# Patient Record
Sex: MALE | Race: WHITE | NOT HISPANIC OR LATINO | Employment: FULL TIME | URBAN - METROPOLITAN AREA
[De-identification: names, ages, dates, MRNs, and addresses within clinical notes are randomized per-mention and may not be internally consistent; named-entity substitution may affect disease eponyms.]

---

## 2017-03-20 PROBLEM — C62.90 MALIGNANT NEOPLASM OF TESTIS: Status: ACTIVE | Noted: 2017-03-20

## 2020-12-30 ENCOUNTER — OFFICE VISIT (OUTPATIENT)
Dept: UROLOGY | Facility: CLINIC | Age: 45
End: 2020-12-30
Payer: COMMERCIAL

## 2020-12-30 VITALS — BODY MASS INDEX: 46.65 KG/M2 | WEIGHT: 315 LBS | HEIGHT: 69 IN

## 2020-12-30 DIAGNOSIS — N50.819 PAIN IN TESTICLE, UNSPECIFIED LATERALITY: Primary | ICD-10-CM

## 2020-12-30 DIAGNOSIS — Z85.230: ICD-10-CM

## 2020-12-30 DIAGNOSIS — N50.812 PAIN IN LEFT TESTICLE: ICD-10-CM

## 2020-12-30 LAB
BILIRUB SERPL-MCNC: NORMAL MG/DL
BLOOD URINE, POC: NORMAL
CLARITY, POC UA: CLEAR
COLOR, POC UA: YELLOW
GLUCOSE UR QL STRIP: NORMAL
KETONES UR QL STRIP: NORMAL
LEUKOCYTE ESTERASE URINE, POC: NORMAL
NITRITE, POC UA: NORMAL
PH, POC UA: 7
PROTEIN, POC: NORMAL
SPECIFIC GRAVITY, POC UA: 1.02
UROBILINOGEN, POC UA: 0.2

## 2020-12-30 PROCEDURE — 99999 PR PBB SHADOW E&M-EST. PATIENT-LVL III: CPT | Mod: PBBFAC,,, | Performed by: UROLOGY

## 2020-12-30 PROCEDURE — 3008F BODY MASS INDEX DOCD: CPT | Mod: CPTII,S$GLB,, | Performed by: UROLOGY

## 2020-12-30 PROCEDURE — 99999 PR PBB SHADOW E&M-EST. PATIENT-LVL III: ICD-10-PCS | Mod: PBBFAC,,, | Performed by: UROLOGY

## 2020-12-30 PROCEDURE — 99204 OFFICE O/P NEW MOD 45 MIN: CPT | Mod: 25,S$GLB,, | Performed by: UROLOGY

## 2020-12-30 PROCEDURE — 3008F PR BODY MASS INDEX (BMI) DOCUMENTED: ICD-10-PCS | Mod: CPTII,S$GLB,, | Performed by: UROLOGY

## 2020-12-30 PROCEDURE — 99204 PR OFFICE/OUTPT VISIT, NEW, LEVL IV, 45-59 MIN: ICD-10-PCS | Mod: 25,S$GLB,, | Performed by: UROLOGY

## 2020-12-30 PROCEDURE — 1126F PR PAIN SEVERITY QUANTIFIED, NO PAIN PRESENT: ICD-10-PCS | Mod: S$GLB,,, | Performed by: UROLOGY

## 2020-12-30 PROCEDURE — 81002 URINALYSIS NONAUTO W/O SCOPE: CPT | Mod: S$GLB,,, | Performed by: UROLOGY

## 2020-12-30 PROCEDURE — 81002 POCT URINE DIPSTICK WITHOUT MICROSCOPE: ICD-10-PCS | Mod: S$GLB,,, | Performed by: UROLOGY

## 2020-12-30 PROCEDURE — 1126F AMNT PAIN NOTED NONE PRSNT: CPT | Mod: S$GLB,,, | Performed by: UROLOGY

## 2020-12-30 NOTE — PROGRESS NOTES
UROLOGY [facility]  12 30 20    Cc L testicular pain    Age 45, says has a discomfort in the L testicle from time to time. Trauma to the testicle is enough to bring up the pain. Voids well.     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 The way this was found, was that he presented a mole on his face, and he felt the mole was changing. 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 Was told it often has another cancer in the body along with it, and underwent extensive axial tomography. This showed a tennis ball size tumor in the thymus, which was excised by dr ania amaro. The tumor was reported germ cell tumor of the thymus. Pt had close follow up with imaging after that and there were no recurrences.        PMH    Surgical:  has a past surgical history that includes Skin cancer excision; Tumor removal (2009); Soft Tissue Biopsy; [place] tooth extraction; Colonoscopy (N/A, 11/24/2015); and Mole removal (02/2016).    Medical:  has a past medical history of Allergic rhinitis due to pollen, Arthritis, Cancer, History of colon polyps, and Obesity (BMI 35.0-39.9 without comorbidity).    Familial: mother had skin cancer    Social: , lives in [place]    Meds:   Current Outpatient Medications on File Prior to Visit   Medication Sig Dispense Refill    cetirizine (ZYRTEC) 10 MG tablet Take 10 mg by mouth d      fexofenadine (ALLEGRA) 180 MG tablet Take 180 mg by mouth daily      ibuprofen (ADVIL) 200 MG tablet Take 200 mg      loratadine (CLARITIN) 10 mg tablet Take 10 mg by mouth once daily.      azelastine (ASTELIN) 137 mcg (0.1 %) nasal spray 2 sprays (274 mcg total) by [place] 30 mL 1    cholecalciferol, vitamin D3, (VITAMIN D3) 5,000 unit Tab Take 5,000 Units by mouth once daily.      EPINEPHrine (EPIPEN) 0.3 mg/0.3 mL AtIn Inject 0.6 mLs (0.6 mg total) in 2 Device 0    glucosamine-chondroitin 500-400 mg tablet Take 1 tablet by mouth  3 (th.      predniSONE (DELTASONE) 20 MG tablet 2 daily x 5 da then 1 daily (Patien) 15 tablet 0    triamcinolone acetonide 0.1% (KENALOG) 0.1 % cream JASSON AA BID FOR 10 DAYS  0     REVIEW OF SYSTEMS  GENERAL:  No headaches or dizzy spells.   HEENT: vision preserved. Sinuses: No complaints.   CARDIOPULMONARY: No swelling of the legs; no chest pain. No shortness of breath.   GASTROINTESTINAL: No heartburn. Denies diarrhea; denies constipation, no blood or mucus in stools.   GENITOURINARY: Denies dysuria, bleeding or incontinence.   MUSCULOSKELETAL: No arthritic complaints such as pain or stiffness.   PSYCHIATRIC: No history of depression or anxiety.   ENDOCRINOLOGIC: markedly obese. No reports of sweating, cold or heat intolerance.   NEUROLOGICAL: No dizziness, syncope, paralysis  LYMPHATICS: No enlarged nodes. No history of splenectomy.    Pt alert, oriented, no distress  HEENT: wnl.  Neck: supple, no JVD, no lymphadenopathy  Chest: CV NSR  Lungs: normal chest expansion, no labored breathing  Abdomen markedly prominent, morbidly obese, nontender, no organomegaly, no masses.  No hernias  Penis circumcised, meatus nl  Testes bilaterally normal, no masses or indurations. r epididymis slightly indurated at the tail, L epididymis also slightly indurated at the tail, more than R. Scrotum development normal  Extremities: no edema, peripheral pulses nl  Neuro: preserved    IMP    Mild aching in L testicle.  Hx of germ cell tumor of the thymus; also sebaceous carcinoma of face skin and basal cell carcinoma of skin (three cancer primaries over 3 years)    No objective pathology found in the testicles. I am ordering scrotal ultrasound

## 2021-02-01 ENCOUNTER — HOSPITAL ENCOUNTER (OUTPATIENT)
Dept: RADIOLOGY | Facility: HOSPITAL | Age: 46
Discharge: HOME | End: 2021-02-01
Attending: UROLOGY
Payer: COMMERCIAL

## 2021-02-01 DIAGNOSIS — N50.812 PAIN IN LEFT TESTICLE: ICD-10-CM

## 2021-02-01 PROCEDURE — 76870 US SCROTUM AND TESTICLES: ICD-10-PCS | Mod: 26,,, | Performed by: RADIOLOGY

## 2021-02-01 PROCEDURE — 76870 US EXAM SCROTUM: CPT | Mod: TC,PO

## 2021-02-01 PROCEDURE — 76870 US EXAM SCROTUM: CPT | Mod: 26,,, | Performed by: RADIOLOGY

## 2021-03-24 ENCOUNTER — PATIENT MESSAGE (OUTPATIENT)
Dept: ADMINISTRATIVE | Facility: OTHER | Age: 46
End: 2021-03-24

## 2021-03-25 ENCOUNTER — IMMUNIZATION (OUTPATIENT)
Dept: FAMILY MEDICINE | Facility: CLINIC | Age: 46
End: 2021-03-25
Payer: COMMERCIAL

## 2021-03-25 DIAGNOSIS — Z23 NEED FOR VACCINATION: Primary | ICD-10-CM

## 2021-03-25 PROCEDURE — 91300 COVID-19, MRNA, LNP-S, PF, 30 MCG/0.3 ML DOSE VACCINE: ICD-10-PCS | Mod: ,,, | Performed by: INTERNAL MEDICINE

## 2021-03-25 PROCEDURE — 91300 COVID-19, MRNA, LNP-S, PF, 30 MCG/0.3 ML DOSE VACCINE: CPT | Mod: ,,, | Performed by: INTERNAL MEDICINE

## 2021-03-25 PROCEDURE — 0001A COVID-19, MRNA, LNP-S, PF, 30 MCG/0.3 ML DOSE VACCINE: ICD-10-PCS | Mod: CV19,,, | Performed by: INTERNAL MEDICINE

## 2021-03-25 PROCEDURE — 0001A COVID-19, MRNA, LNP-S, PF, 30 MCG/0.3 ML DOSE VACCINE: CPT | Mod: CV19,,, | Performed by: INTERNAL MEDICINE

## 2021-04-15 ENCOUNTER — IMMUNIZATION (OUTPATIENT)
Dept: FAMILY MEDICINE | Facility: CLINIC | Age: 46
End: 2021-04-15
Payer: COMMERCIAL

## 2021-04-15 DIAGNOSIS — Z23 NEED FOR VACCINATION: Primary | ICD-10-CM

## 2021-04-15 PROCEDURE — 91300 COVID-19, MRNA, LNP-S, PF, 30 MCG/0.3 ML DOSE VACCINE: CPT | Mod: PBBFAC | Performed by: FAMILY MEDICINE

## 2021-04-15 PROCEDURE — 0002A COVID-19, MRNA, LNP-S, PF, 30 MCG/0.3 ML DOSE VACCINE: CPT | Mod: PBBFAC | Performed by: FAMILY MEDICINE

## 2022-09-15 ENCOUNTER — TELEPHONE (OUTPATIENT)
Dept: GASTROENTEROLOGY | Facility: CLINIC | Age: 47
End: 2022-09-15
Payer: COMMERCIAL

## 2022-09-15 NOTE — TELEPHONE ENCOUNTER
Rescheduled colonoscopy with Dr. Sainz to [facility] on 1/12/23 due to BMI.  Patient agreed to new appointment date and time.

## 2022-09-15 NOTE — TELEPHONE ENCOUNTER
----- Message from Cortney Wiggins LPN sent at 9/15/2022 11:06 AM CDT -----  Contact: pt at [phone]    ----- Message -----  From: Tamiko Mcginnis  Sent: 9/15/2022  10:30 AM CDT  To: [facility] Staff    Type: Needs Medical Advice  Who Called:  pt    Best Call Back Number: [phone]    Additional Information: pt is calling the office to schedule a scope. Please call back and advise.

## 2022-09-19 ENCOUNTER — OFFICE VISIT (OUTPATIENT)
Dept: GASTROENTEROLOGY | Facility: CLINIC | Age: 47
End: 2022-09-19
Payer: COMMERCIAL

## 2022-09-19 VITALS — HEIGHT: 69 IN | BODY MASS INDEX: 46.65 KG/M2 | WEIGHT: 315 LBS

## 2022-09-19 DIAGNOSIS — R19.8 IRREGULAR BOWEL HABITS: ICD-10-CM

## 2022-09-19 DIAGNOSIS — R12 HEARTBURN: ICD-10-CM

## 2022-09-19 DIAGNOSIS — Z79.01 ANTICOAGULANT LONG-TERM USE: ICD-10-CM

## 2022-09-19 DIAGNOSIS — R10.9 ABDOMINAL CRAMPING: ICD-10-CM

## 2022-09-19 DIAGNOSIS — R11.0 NAUSEA: ICD-10-CM

## 2022-09-19 DIAGNOSIS — R10.32 LLQ ABDOMINAL PAIN: Primary | ICD-10-CM

## 2022-09-19 DIAGNOSIS — R19.7 INTERMITTENT DIARRHEA: ICD-10-CM

## 2022-09-19 DIAGNOSIS — R19.5 CHANGE IN STOOL CALIBER: ICD-10-CM

## 2022-09-19 DIAGNOSIS — Z86.010 HISTORY OF COLON POLYPS: ICD-10-CM

## 2022-09-19 PROCEDURE — 3008F BODY MASS INDEX DOCD: CPT | Mod: CPTII,S$GLB,, | Performed by: NURSE PRACTITIONER

## 2022-09-19 PROCEDURE — 99999 PR PBB SHADOW E&M-EST. PATIENT-LVL IV: CPT | Mod: PBBFAC,,, | Performed by: NURSE PRACTITIONER

## 2022-09-19 PROCEDURE — 99204 PR OFFICE/OUTPT VISIT, NEW, LEVL IV, 45-59 MIN: ICD-10-PCS | Mod: S$GLB,,, | Performed by: NURSE PRACTITIONER

## 2022-09-19 PROCEDURE — 3008F PR BODY MASS INDEX (BMI) DOCUMENTED: ICD-10-PCS | Mod: CPTII,S$GLB,, | Performed by: NURSE PRACTITIONER

## 2022-09-19 PROCEDURE — 99204 OFFICE O/P NEW MOD 45 MIN: CPT | Mod: S$GLB,,, | Performed by: NURSE PRACTITIONER

## 2022-09-19 PROCEDURE — 99999 PR PBB SHADOW E&M-EST. PATIENT-LVL IV: ICD-10-PCS | Mod: PBBFAC,,, | Performed by: NURSE PRACTITIONER

## 2022-09-19 RX ORDER — OMEPRAZOLE 20 MG/1
20 CAPSULE, DELAYED RELEASE ORAL DAILY PRN
COMMUNITY

## 2022-09-19 NOTE — PATIENT INSTRUCTIONS
Eating a High-Fiber Diet  Fiber is what gives strength and structure to plants. Most grains, beans, vegetables, and fruits contain fiber. Foods rich in fiber are often low in calories and fat, and they fill you up more. They may also reduce your risks for certain health problems. To find out the amount of fiber in canned, packaged, or frozen foods, read the Nutrition Facts label. It tells you how much fiber is in a serving.    Types of fiber and their benefits  There are two types of fiber: insoluble and soluble. They both aid digestion and help you maintain a healthy weight.  Insoluble fiber. This is found in whole grains, cereals, certain fruits and vegetables such as apple skin, corn, and carrots. Insoluble fiber may prevent constipation and reduce the risk for certain types of cancer.  Soluble fiber. This type of fiber is in oats, beans, and certain fruits and vegetables such as strawberries and peas. Soluble fiber can reduce cholesterol, which may help lower the risk for heart disease. It also helps control blood sugar levels.  Look for high-fiber foods  Try these foods to add fiber to your diet:  Whole-grain breads and cereals. Try to eat 6 to 8 ounces a day. Include wheat and oat bran cereals, whole-wheat muffins or toast, and corn tortillas in your meals.  Fruits. Try to eat 2 cups a day. Apples, oranges, strawberries, pears, and bananas are good sources. (Note: Fruit juice is low in fiber.)  Vegetables. Try to eat at least 2.5 cups a day. Add asparagus, carrots, broccoli, peas, and corn to your meals.  Beans. One cup of cooked lentils gives you over 15 grams of fiber. Try navy beans, lentils, and chickpeas.  Seeds. A small handful of seeds gives you about 3 grams of fiber. Try sunflower seeds.  Keep track of your fiber  Keep track of how much fiber you eat. Start by reading food labels. Then eat a variety of foods high in fiber. As you begin to eat more fiber, ask your healthcare provider how much water  you should be drinking to keep your digestive system working smoothly.  You should aim for a certain amount of fiber in your diet each day. If you are a woman, that amount is between 25 and 28 grams per day. Men should aim for 30 to 33 grams per day. After age 50, your daily fiber needs drop to 22 grams for women and 28 grams for men.  Before you reach for the fiber supplements, think about this. Fiber is found naturally in healthy whole foods. It gives you that feeling of fullness after you eat. Taking fiber supplements or eating fiber-enriched foods will not give you this full feeling.  Your fiber intake is a good measure for the quality of your overall diet. If you are missing out on your daily amount of fiber, you may be lacking other important nutrients as well.  Date Last Reviewed: 5/11/2015 © 2000-2017 [employer]. [address] [place] [address]. All rights reserved. This information is not intended as a substitute for professional medical care. Always follow your healthcare professional's instructions.           Abdominal Pain    Abdominal pain is pain in the stomach or belly area. Everyone has this pain from time to time. In many cases it goes away on its own. But abdominal pain can sometimes be due to a serious problem, such as appendicitis. So its important to know when to seek help.  Causes of abdominal pain  There are many possible causes of abdominal pain. Common causes in adults include:  Constipation, diarrhea, or gas  Stomach acid flowing back up into the esophagus (acid reflux or heartburn)  Severe acid reflux, called GERD (gastroesophageal reflux disease)  A sore in the lining of the stomach or small intestine (peptic ulcer)  Inflammation of the gallbladder, liver, or pancreas  Gallstones or kidney stones  Appendicitis   Intestinal blockage   An internal organ pushing through a muscle or other tissue (hernia)  Urinary tract infections  In women, menstrual cramps,  fibroids, or endometriosis  Inflammation or infection of the intestines  Diagnosing the cause of abdominal pain  Your healthcare provider will do a physical exam help find the cause of your pain. If needed, tests will be ordered. Belly pain has many possible causes. So it can be hard to find the reason for your pain. Giving details about your pain can help. Tell your provider where and when you feel the pain, and what makes it better or worse. Also let your provider know if you have other symptoms such as:  Fever  Tiredness  Upset stomach (nausea)  Vomiting  Changes in bathroom habits  Treating abdominal pain  Some causes of pain need emergency medical treatment right away. These include appendicitis or a bowel blockage. Other problems can be treated with rest, fluids, or medicines. Your healthcare provider can give you specific instructions for treatment or self-care based on what is causing your pain.  If you have vomiting or diarrhea, sip water or other clear fluids. When you are ready to eat solid foods again, start with small amounts of easy-to-digest, low-fat foods. These include apple sauce, toast, or crackers.   When to seek medical care  Call 911 or go to the hospital right away if you:  Cant pass stool and are vomiting  Are vomiting blood or have bloody diarrhea or black, tarry diarrhea  Have chest, neck, or shoulder pain  Feel like you might pass out  Have pain in your shoulder blades with nausea  Have sudden, severe belly pain  Have new, severe pain unlike any you have felt before  Have a belly that is rigid, hard, and tender to touch  Call your healthcare provider if you have:  Pain for more than 5 days  Bloating for more than 2 days  Diarrhea for more than 5 days  A fever of 100.4°F (38.0°C) or higher, or as directed by your provider  Pain that gets worse  Weight loss for no reason  Continued lack of appetite  Blood in your stool  How to prevent abdominal pain  Here are some tips to help prevent  abdominal pain:  Eat smaller amounts of food at one time.  Avoid greasy, fried, or other high-fat foods.  Avoid foods that give you gas.  Exercise regularly.  Drink plenty of fluids.  To help prevent GERD symptoms:  Quit smoking.  Reduce alcohol and certain foods that increase stomach acid.  Avoid aspirin and over-the-counter pain and fever medicines (NSAIDS or nonsteroidal anti-inflammatory drugs), if possible  Lose extra weight.  Finish eating at least 2 hours before you go to bed or lie down.  Raise the head of your bed.  Date Last Reviewed: 7/1/2016  © 0623-0131 [employer]. [address] [place] [address], [place] [address]. All rights reserved. This information is not intended as a substitute for professional medical care. Always follow your healthcare professional's instructions.

## 2022-09-19 NOTE — PROGRESS NOTES
Subjective:       Patient ID: Josef Soriano is a 47 y.o. male Body mass index is 57.27 kg/m².    Chief Complaint: Abdominal Pain, Diarrhea, Constipation (Onset: few months, noticed it more during labor day has progressively gotten worse SX: Urge to go to bathroom consistent, heavy dull cramping in LLQ and travels to middle, on and off diarrhea and constipation. No significant changes to diet. Has no taken medication for pain. ), and Abdominal Cramping    This patient is new to me.  Referring Provider: Dr. Sabra Mcknight for history of colon polyps.  Established patient of Dr. Sainz (last in 2015).     Patient is here with a family member, whom assisted with history.    GI Problem  The primary symptoms include abdominal pain, nausea (very rare and mild) and diarrhea. Primary symptoms do not include fever, weight loss, fatigue, vomiting, melena, hematochezia or dysuria.   The abdominal pain began more than 2 days ago (intermittent for several months, worsened since 9/5/2022, waxing and waning since then). The abdominal pain is located in the LLQ (described as cramping, dull; lasts for several hours). The abdominal pain radiates to the suprapubic region. The severity of the abdominal pain is 6/10 (currently).   Risk factors: denies recent antibiotics/hospitalization, foreign travel, ill contacts, or suspect food intake.   The illness is also significant for bloating, constipation and tenesmus. The illness does not include chills, dysphagia or odynophagia. Associated symptoms comments: Fecal urgency, diarrhea (intermittent, occurs ~ twice a week with a few loose stools that day), and constipation (described as more straining to have a bowel movement, has at least a bowel movement every day); otherwise bowel movements are 2-3 times a day of formed stools and lately thinner stools at times  Regular bowel habits are 3 times a day of formed stool; abdominal pain not associated with eating or bowel habits.  Significant associated medical issues include GERD (occasional, maybe once a month, triggered by spicy foods; takes OTC omeprazole PRN which helps).     Review of Systems   Constitutional:  Negative for appetite change, chills, fatigue, fever and weight loss.   HENT:  Negative for sore throat and trouble swallowing.    Respiratory:  Negative for cough, choking and shortness of breath.    Cardiovascular:  Negative for chest pain.   Gastrointestinal:  Positive for abdominal pain, bloating, constipation, diarrhea and nausea (very rare and mild). Negative for anal bleeding, blood in stool, dysphagia, hematochezia, melena, rectal pain and vomiting.   Genitourinary:  Negative for difficulty urinating, dysuria and flank pain.   Neurological:  Negative for weakness.       Past Medical History:   Diagnosis Date    Allergic rhinitis due to pollen 10/10/2013    Arthritis     Cancer 2009    germ cell tumor of thymus, also skin cancer    History of colon polyps     Obesity (BMI 35.0-39.9 without comorbidity) 10/10/2013     Past Surgical History:   Procedure Laterality Date    COLONOSCOPY N/A 2015    Procedure: COLONOSCOPY;  Surgeon: Yfn Sainz MD;  [facility];  Service: Endoscopy;  Laterality: N/A; repeat in 5 years for surveillance    MOLE REMOVAL  2016    SKIN CANCER EXCISION      Sebaceous cancer of face. Also BCE    SOFT TISSUE BIOPSY      thymus biopsy    TUMOR REMOVAL      thymus- opened chest    WISDOM TOOTH EXTRACTION       Family History   Problem Relation Age of Onset    Cancer Mother         skin cancer    Colon cancer Neg Hx     Crohn's disease Neg Hx     Celiac disease Neg Hx     Ulcerative colitis Neg Hx      Social History     Tobacco Use    Smoking status: Former     Types: Cigarettes     Quit date: 11/10/1998     Years since quittin.8    Smokeless tobacco: Former    Tobacco comments:     chewed tobacco as teenager   Substance Use Topics    Alcohol use: Not Currently      Comment: rare    Drug use: No     Wt Readings from Last 10 Encounters:   09/19/22 (!) 175.9 kg (387 lb 12.6 oz)   06/23/22 (!) 173.9 kg (383 lb 6.4 oz)   05/26/21 (!) 171.9 kg (379 lb)   05/03/21 (!) 177.4 kg (391 lb)   03/05/21 (!) 171.9 kg (379 lb)   12/30/20 (!) 169 kg (372 lb 9.2 oz)   06/02/19 (!) 169 kg (372 lb 9.2 oz)   03/27/19 (!) 168.3 kg (371 lb)   01/08/19 (!) 163 kg (359 lb 6.4 oz)   08/24/18 (!) 164.3 kg (362 lb 4.8 oz)     Lab Results   Component Value Date    WBC 12.20 04/01/2022    HGB 12.8 (L) 04/01/2022    HCT 41.0 04/01/2022    MCV 82 04/01/2022     04/01/2022     CMP  Sodium   Date Value Ref Range Status   04/01/2022 141 136 - 145 mmol/L Final     Potassium   Date Value Ref Range Status   04/01/2022 4.0 3.5 - 5.1 mmol/L Final     Chloride   Date Value Ref Range Status   04/01/2022 104 95 - 110 mmol/L Final     CO2   Date Value Ref Range Status   04/01/2022 28 22 - 31 mmol/L Final     Glucose   Date Value Ref Range Status   04/01/2022 92 70 - 110 mg/dL Final     Comment:     The ADA recommends the following guidelines for fasting glucose:    Normal:       less than 100 mg/dL    Prediabetes:  100 mg/dL to 125 mg/dL    Diabetes:     126 mg/dL or higher       BUN   Date Value Ref Range Status   04/01/2022 18 9 - 21 mg/dL Final     Creatinine   Date Value Ref Range Status   04/01/2022 0.95 0.50 - 1.40 mg/dL Final     Calcium   Date Value Ref Range Status   04/01/2022 8.6 8.4 - 10.2 mg/dL Final     Total Protein   Date Value Ref Range Status   04/01/2022 6.6 6.0 - 8.4 g/dL Final     Albumin   Date Value Ref Range Status   04/01/2022 3.9 3.5 - 5.2 g/dL Final     Total Bilirubin   Date Value Ref Range Status   04/01/2022 0.4 0.2 - 1.3 mg/dL Final     Alkaline Phosphatase   Date Value Ref Range Status   04/01/2022 118 38 - 145 U/L Final     AST   Date Value Ref Range Status   04/01/2022 32 17 - 59 U/L Final     ALT   Date Value Ref Range Status   04/01/2022 33 0 - 50 U/L Final     Anion Gap   Date  Value Ref Range Status   04/01/2022 9 8 - 16 mmol/L Final     eGFR if    Date Value Ref Range Status   04/01/2022 >60 >60 mL/min/1.73 m^2 Final     eGFR if non    Date Value Ref Range Status   04/01/2022 >60 >60 mL/min/1.73 m^2 Final     Comment:     Calculation used to obtain the estimated glomerular filtration  rate (eGFR) is the CKD-EPI equation.        Reviewed prior medical records including endoscopy history (see surgical history/procedures).    Objective:      Physical Exam  Vitals and nursing note reviewed.   Constitutional:       General: He is not in acute distress.     Appearance: Normal appearance. He is well-developed. He is morbidly obese. He is not diaphoretic.   HENT:      Mouth/Throat:      Comments: Patient is wearing a face mask, which covers patient's mouth and nose, due to COVID 19 concerns.  Eyes:      General: No scleral icterus.     Conjunctiva/sclera: Conjunctivae normal.   Pulmonary:      Effort: Pulmonary effort is normal. No respiratory distress.      Breath sounds: Normal breath sounds. No wheezing.   Abdominal:      General: Bowel sounds are normal. There is no distension or abdominal bruit.      Palpations: Abdomen is soft. Abdomen is not rigid. There is no mass.      Tenderness: There is abdominal tenderness (mild) in the left lower quadrant. There is no guarding or rebound. Negative signs include Munguia's sign and McBurney's sign.   Skin:     General: Skin is warm and dry.      Coloration: Skin is not pale.      Findings: No erythema or rash.      Comments: Non-jaundiced   Neurological:      Mental Status: He is alert and oriented to person, place, and time.   Psychiatric:         Behavior: Behavior normal.         Thought Content: Thought content normal.         Judgment: Judgment normal.       Assessment:       1. LLQ abdominal pain    2. Abdominal cramping    3. Change in stool caliber    4. Irregular bowel habits    5. Intermittent diarrhea    6.  History of colon polyps    7. Nausea    8. Heartburn    9. Anticoagulant long-term use          Plan:       LLQ abdominal pain & Abdominal cramping  -     CT Abdomen Pelvis With Contrast; Future; Expected date: 09/19/2022  - schedule Colonoscopy to be done in 4-6 weeks (after CT scan is done and colitis is ruled out), discussed procedure with the patient, including risks and benefits, patient verbalized understanding  - discussed about a course of bentyl; patient verbalized understanding and patient declined prescription at this time    Change in stool caliber  -     CT Abdomen Pelvis With Contrast; Future; Expected date: 09/19/2022  - schedule Colonoscopy to be done in 4-6 weeks (after CT scan is done and colitis is ruled out), discussed procedure with the patient, including risks and benefits, patient verbalized understanding    Irregular bowel habits & Intermittent diarrhea  -     CT Abdomen Pelvis With Contrast; Future; Expected date: 09/19/2022  -     Pancreatic elastase, fecal; Future; Expected date: 09/19/2022  -     Stool Exam-Ova,Cysts,Parasites; Future; Expected date: 09/19/2022  -     Fecal fat, qualitative; Future; Expected date: 09/19/2022  -     Giardia / Cryptosporidum, EIA; Future; Expected date: 09/19/2022  -     Occult blood x 1, stool; Future; Expected date: 09/19/2022  -     pH, stool; Future; Expected date: 09/19/2022  -     Rotavirus antigen, stool; Future; Expected date: 09/19/2022  -     WBC, Stool; Future; Expected date: 09/19/2022  -     Stool culture; Future; Expected date: 09/19/2022  -     Clostridium difficile EIA; Future; Expected date: 09/19/2022  -     Adenovirus Molecular Detection, PCR, Non-Blood Stool; Future; Expected date: 09/19/2022  -     Tissue Transglutaminase, IgA; Future; Expected date: 09/19/2022  -     IgA; Future; Expected date: 09/19/2022  -     CBC Without Differential; Future; Expected date: 09/19/2022  -     Comprehensive Metabolic Panel; Future; Expected date:  09/19/2022  - schedule Colonoscopy to be done in 4-6 weeks (after CT scan is done and colitis is ruled out), discussed procedure with the patient, including risks and benefits, patient verbalized understanding    History of colon polyps  - schedule Colonoscopy to be done in 4-6 weeks (after CT scan is done and colitis is ruled out), discussed procedure with the patient, including risks and benefits, patient verbalized understanding    Nausea  -     CT Abdomen Pelvis With Contrast; Future; Expected date: 09/19/2022  - Possible EGD pending results of testing and if symptoms persist/worsen    Heartburn  - CAN CONTINUE OTC PRILOSEC 20 MG ONCE DAILY AS DIRECTED ON PACKAGING PRN; recommend if heartburn/reflux starts to occur more frequently then recommend to follow-up for continued evaluation & management  -Educated patient on lifestyle modifications to help control/reduce reflux/abdominal pain including: avoid large meals, avoid eating within 2-3 hours of bedtime (avoid late night eating & lying down soon after eating), elevate head of bed if nocturnal symptoms are present, smoking cessation (if current smoker), & weight loss (if overweight).   -Educated to avoid known foods which trigger reflux symptoms & to minimize/avoid high-fat foods, chocolate, caffeine, citrus, alcohol, & tomato products.  -Advised to avoid/limit use of NSAID's, since they can cause GI upset, bleeding, and/or ulcers. If needed, take with food.   - Possible EGD pending results of testing and if symptoms persist/worsen    Anticoagulant long-term use  - informed patient that the anticoagulant(s) will likely need to be held for endoscopy, nurse will confirm with endoscopist, cardiologist, and/or PCP.    Follow up in about 1 month (around 10/19/2022), or if symptoms worsen or fail to improve.      If no improvement in symptoms or symptoms worsen, call/follow-up at clinic or go to ER.        45 minutes of total time spent on the encounter, which  includes face to face time and non-face to face time preparing to see the patient (e.g., review of tests), Obtaining and/or reviewing separately obtained history, Documenting clinical information in the electronic or other health record, Independently interpreting results (not separately reported) and communicating results to the patient/family/caregiver, or Care coordination (not separately reported).

## 2022-09-20 ENCOUNTER — PATIENT MESSAGE (OUTPATIENT)
Dept: GASTROENTEROLOGY | Facility: CLINIC | Age: 47
End: 2022-09-20
Payer: COMMERCIAL

## 2022-09-20 NOTE — TELEPHONE ENCOUNTER
Called patient to explain that his insurance did not authorize the ct scan. We are doing what we can for his plan of care but this was his insurance. Informed patient if he is needing immediate care to proceed to the ER and they can do a CT scan. Patient said okay and hung up the phone.

## 2022-09-21 ENCOUNTER — TELEPHONE (OUTPATIENT)
Dept: GASTROENTEROLOGY | Facility: CLINIC | Age: 47
End: 2022-09-21
Payer: COMMERCIAL

## 2022-09-21 ENCOUNTER — PATIENT MESSAGE (OUTPATIENT)
Dept: GASTROENTEROLOGY | Facility: CLINIC | Age: 47
End: 2022-09-21
Payer: COMMERCIAL

## 2022-09-22 ENCOUNTER — PATIENT MESSAGE (OUTPATIENT)
Dept: GASTROENTEROLOGY | Facility: CLINIC | Age: 47
End: 2022-09-22
Payer: COMMERCIAL

## 2022-09-23 ENCOUNTER — TELEPHONE (OUTPATIENT)
Dept: GASTROENTEROLOGY | Facility: CLINIC | Age: 47
End: 2022-09-23
Payer: COMMERCIAL

## 2022-09-23 NOTE — TELEPHONE ENCOUNTER
----- Message from Andre Brown sent at 9/23/2022  1:46 PM CDT -----  Type: Needs Medical Advice  Who Called:  pt  Symptoms (please be specific):  pt said he have a colonoscopy on 9/26 and he need to know what time is his appt--please call and advise--  Best Call Back Number: [phone] (home) [phone] (work)    Additional Information: thank you

## 2022-09-23 NOTE — TELEPHONE ENCOUNTER
Called patient in regards appt time, informed patient that [facility] calls the day before. If patient does not receive a call by 4pm then to call us back and we can reach out.

## 2022-09-27 ENCOUNTER — PATIENT MESSAGE (OUTPATIENT)
Dept: GASTROENTEROLOGY | Facility: CLINIC | Age: 47
End: 2022-09-27
Payer: COMMERCIAL

## 2022-09-29 ENCOUNTER — PATIENT MESSAGE (OUTPATIENT)
Dept: GASTROENTEROLOGY | Facility: CLINIC | Age: 47
End: 2022-09-29
Payer: COMMERCIAL

## 2022-10-01 ENCOUNTER — TELEPHONE (OUTPATIENT)
Dept: GASTROENTEROLOGY | Facility: CLINIC | Age: 47
End: 2022-10-01
Payer: COMMERCIAL

## 2022-10-03 NOTE — TELEPHONE ENCOUNTER
Colon biopsies benign mild inflammation (non-specific); Colon polyp benign. F/U GI clinic as needed, f/u C-scope 5 years.

## 2022-10-05 ENCOUNTER — PATIENT MESSAGE (OUTPATIENT)
Dept: GASTROENTEROLOGY | Facility: CLINIC | Age: 47
End: 2022-10-05
Payer: COMMERCIAL

## 2022-10-05 RX ORDER — DICYCLOMINE HYDROCHLORIDE 10 MG/1
10 CAPSULE ORAL EVERY 6 HOURS PRN
Qty: 50 CAPSULE | Refills: 2 | Status: SHIPPED | OUTPATIENT
Start: 2022-10-05 | End: 2022-11-04

## 2022-11-03 ENCOUNTER — PATIENT MESSAGE (OUTPATIENT)
Dept: GASTROENTEROLOGY | Facility: CLINIC | Age: 47
End: 2022-11-03
Payer: COMMERCIAL

## 2022-11-10 ENCOUNTER — TELEPHONE (OUTPATIENT)
Dept: GASTROENTEROLOGY | Facility: CLINIC | Age: 47
End: 2022-11-10
Payer: COMMERCIAL

## 2022-11-10 NOTE — TELEPHONE ENCOUNTER
----- Message from Yfn Sainz MD sent at 11/10/2022  2:02 PM CST -----  Tell pt stool evaluation negative for infection, positive lactoferrin (an indicator of inflammation). Continue bentyl PRN, and schedule f/u office visit with NP Kortney to review.

## 2025-01-20 PROBLEM — F32.5 MAJOR DEPRESSIVE DISORDER WITH SINGLE EPISODE, IN FULL REMISSION: Status: ACTIVE | Noted: 2025-01-20

## 2025-05-16 ENCOUNTER — OFFICE VISIT (OUTPATIENT)
Dept: FAMILY MEDICINE | Facility: CLINIC | Age: 50
End: 2025-05-16
Payer: COMMERCIAL

## 2025-05-16 VITALS
BODY MASS INDEX: 46.65 KG/M2 | DIASTOLIC BLOOD PRESSURE: 84 MMHG | WEIGHT: 315 LBS | OXYGEN SATURATION: 98 % | HEIGHT: 69 IN | HEART RATE: 72 BPM | SYSTOLIC BLOOD PRESSURE: 124 MMHG

## 2025-05-16 DIAGNOSIS — D64.9 NORMOCYTIC ANEMIA: ICD-10-CM

## 2025-05-16 DIAGNOSIS — G47.33 OSA ON CPAP: ICD-10-CM

## 2025-05-16 DIAGNOSIS — R12 HEARTBURN: ICD-10-CM

## 2025-05-16 DIAGNOSIS — Z00.00 ENCOUNTER FOR SCREENING AND PREVENTATIVE CARE: ICD-10-CM

## 2025-05-16 DIAGNOSIS — E61.1 LOW IRON: ICD-10-CM

## 2025-05-16 DIAGNOSIS — Z76.89 ENCOUNTER TO ESTABLISH CARE WITH NEW DOCTOR: Primary | ICD-10-CM

## 2025-05-16 DIAGNOSIS — R20.2 PARESTHESIA: ICD-10-CM

## 2025-05-16 DIAGNOSIS — M79.89 LEG SWELLING: ICD-10-CM

## 2025-05-16 DIAGNOSIS — J31.0 CHRONIC RHINITIS: ICD-10-CM

## 2025-05-16 PROCEDURE — 99999 PR PBB SHADOW E&M-EST. PATIENT-LVL V: CPT | Mod: PBBFAC,,, | Performed by: STUDENT IN AN ORGANIZED HEALTH CARE EDUCATION/TRAINING PROGRAM

## 2025-05-16 RX ORDER — GABAPENTIN 300 MG/1
300 CAPSULE ORAL 3 TIMES DAILY
Qty: 90 CAPSULE | Refills: 11 | Status: SHIPPED | OUTPATIENT
Start: 2025-05-16 | End: 2026-05-16

## 2025-05-16 RX ORDER — LANSOPRAZOLE 30 MG/1
30 CAPSULE, DELAYED RELEASE ORAL DAILY PRN
COMMUNITY

## 2025-05-16 NOTE — PROGRESS NOTES
Subjective:       Patient ID: Josef Soriano is a 49 y.o. male.    Chief Complaint: Establish Care and Leg Pain (Rt Side Thigh)    Leg Pain       49 year old male presents with right thigh numbness, tingling, and pain. The numbness has been going on for about a year but in the last two weeks it has progressed from tingling to pain. No weakness. No history of injury. No numbness, tingling anywhere else. Does not routinely wear wallet in back pocket. Only change in physical activity about a month ago they got a new Tempur-pedic mattress so sleeping positions are new. At his most physically active he spends 15 minutes on recumbent bike at home 3-4 times weekly but has decreased this due to the pain. It seems worse not with the activity but rather with the clothing touching/rubbing the area - the same sensation could happen with the sheet. Eats a varied diet but has had some GI issues and avoids some foods (eg. Red onions) with good relief. Has been low in vitamin D before and iron - had to stop giving blood. No thoughts of self harm - feels mood is stable on lexapro and following every 3 weeks with counselor, Lakeshia Maravilal. Takes as needed lansoprazole or omeprazole for heartburn. Alternates claritin, allegra, zyrtec for allergies. Uses CPAP for EZIO diagnosed with Dr. Penelope Alvarenga about a year ago. 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 Follows annually with Dr. Paige for pregnancy test - never needed chemotherapy or radiation. Had surgery and chest tumor was completely encapsulated on removal.    Exam with no skin changes, possibly flushed versus recently patient admits he has been rubbing. Mild tenderness to light palpation.    Plan:  Blood pressure improved on recheck with different cuff - was initially elevated which is not typical.  Trial of gabapentin.  If symptoms worsening or persistent, return for  "re-evaluation.  Check labs before return for check up.    Past Medical History:   Diagnosis Date    Allergic rhinitis due to pollen 10/10/2013    Arthritis     Basal cell carcinoma (BCC) in situ of skin     Cancer 01/01/2009    germ cell tumor of thymus, also skin cancer    Depression     History of colon polyps        Past Surgical History:   Procedure Laterality Date    BASAL CELL CARCINOMA EXCISION  11/26/2024    on [facility] at [facility]    COLONOSCOPY N/A 11/24/2015    Procedure: COLONOSCOPY;  Surgeon: Yfn Sainz MD;  Location: [facility] ENDO;  Service: Endoscopy;  Laterality: N/A; repeat in 5 years for surveillance    COLONOSCOPY N/A 09/26/2022    Procedure: COLONOSCOPY;  Surgeon: Yfn Sainz MD;  Location: [facility] ENDO;  Service: Endoscopy;  Laterality: N/A;    MOLE REMOVAL  02/2016    SKIN CANCER EXCISION      Sebaceous cancer of face. Also BCE    SOFT TISSUE BIOPSY      thymus biopsy    TUMOR REMOVAL  2009    thymus- opened chest    WISDOM TOOTH EXTRACTION         Review of patient's allergies indicates:   Allergen Reactions    Venom-wasp Swelling and Other (See Comments)     Shortness of breath    Sulfa (sulfonamide antibiotics) Itching and Rash       Social History[1]    Medications Ordered Prior to Encounter[2]    Family History   Problem Relation Name Age of Onset    Cancer Mother          skin cancer    Alcohol abuse Mother      Drug abuse Mother      Stroke Mother      Other (dementia) Mother      Other (Other) Mother          victim of physical abuse in 20s    Colon cancer Neg Hx      Crohn's disease Neg Hx      Celiac disease Neg Hx      Ulcerative colitis Neg Hx         Review of Systems   Musculoskeletal:  Positive for leg pain.         Objective:      /84   Pulse 72   Ht 5' 9" (1.753 m)   Wt (!) 179.1 kg (394 lb 13.5 oz)   SpO2 98%   BMI 58.31 kg/m²   Physical Exam  Constitutional:       General: He is not in acute distress.     Appearance: He is not ill-appearing, " toxic-appearing or diaphoretic.   Cardiovascular:      Rate and Rhythm: Normal rate and regular rhythm.      Heart sounds: Normal heart sounds.   Pulmonary:      Effort: Pulmonary effort is normal.      Breath sounds: Normal breath sounds.   Skin:     General: Skin is warm and dry.      Findings: No lesion.      Comments: Right upper leg, anterolateral mild tenderness to light palpation   Neurological:      General: No focal deficit present.      Mental Status: He is alert.   Psychiatric:         Mood and Affect: Mood normal.         Behavior: Behavior normal.         Assessment:       1. Encounter to establish care with new doctor    2. Paresthesia    3. EZIO on CPAP    4. Normocytic anemia    5. Low iron    6. Leg swelling    7. Heartburn    8. Chronic rhinitis    9. Encounter for screening and preventative care        Plan:       Encounter to establish care with new doctor    Paresthesia  -     gabapentin (NEURONTIN) 300 MG capsule; Take 1 capsule (300 mg total) by mouth 3 (three) times daily.  Dispense: 90 capsule; Refill: 11  -     Iron and TIBC; Future; Expected date: 05/16/2025  -     Ferritin; Future; Expected date: 05/16/2025  -     Vitamin B12; Future; Expected date: 05/16/2025  -     Folate; Future; Expected date: 05/16/2025  -     Vitamin D; Future; Expected date: 05/16/2025  - Suspect meralgia paresthetica. No blisters or skin lesions though focal numbness and tingling may precede lesions in shingles.  - Trial of gabapentin - start low and slow with once every evening - can make you tired, dizzy. May increase to three times daily as needed. PDMP reviewed.    EZIO on CPAP  - Continue to follow with Dr. Alvarenga as scheduled (reportedly due for follow up next week).    Normocytic anemia  -     Iron and TIBC; Future; Expected date: 05/16/2025  -     Ferritin; Future; Expected date: 05/16/2025  -     Vitamin B12; Future; Expected date: 05/16/2025  -     Folate; Future; Expected date: 05/16/2025  - Update iron  studies, vitamin B12, and folate.    Low iron  -     Iron and TIBC; Future; Expected date: 2025  -     Ferritin; Future; Expected date: 2025  - Takes iron once weekly. History of low. Update iron studies.    Leg swelling  History of - takes Lasix with Kcl 10 mEq with good relief.    Heartburn  - Stable. Alternates lansoprazole or omeprazole as needed.    Chronic rhinitis  -  Alternates between zyrtec, allegra, claritin daily as needed.    Encounter for screening and preventative care  -     CBC Auto Differential; Future; Expected date: 2025  -     Comprehensive Metabolic Panel; Future; Expected date: 2025  -     Hemoglobin A1C; Future; Expected date: 2025  -     Lipid Panel; Future; Expected date: 2025  -     TSH; Future; Expected date: 2025        Counseled on regular exercise (150 mins of moderate aerobic activity weekly or 75 mins of vigorous weekly), maintenance of a healthy weight, balanced diet rich in fruits/vegetables and lean protein (eg. Mediterranean diet), and avoidance of unhealthy habits like smoking and excessive alcohol intake.    Return in three months or sooner if needed.         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 Lives in [place] with his wife who is a cardiac nurse and their 16 year old daughter. Two pet cats.      Social Drivers of Health     Financial Resource Strain: Patient Declined (8/22/2024)    Overall Financial Resource Strain (CARDIA)     Difficulty of Paying Living Expenses: Patient declined   Food Insecurity: Patient Declined (8/22/2024)    Hunger Vital Sign     Worried About Running Out of Food in the Last Year: Patient declined     Ran Out of Food in the Last Year: Patient declined   Transportation Needs: No Transportation Needs (8/10/2023)    PRAPARE - Transportation     Lack of Transportation (Medical): No     Lack of Transportation (Non-Medical): No   Physical Activity: Insufficiently Active (8/22/2024)    Exercise Vital Sign     Days of Exercise per Week: 3 days     Minutes of Exercise per Session: 20 min   Stress: Patient Declined (8/22/2024)    [employer] - Occupational Stress Questionnaire     Feeling of Stress : Patient declined   Housing Stability: Unknown (8/22/2024)    Housing Stability Vital Sign     Unable to Pay for Housing in the Last Year: Patient declined   [2]   Current Outpatient Medications on File Prior to Visit   Medication Sig Dispense Refill    aspirin (ECOTRIN) 81 MG EC tablet Take 1 tablet (81 mg total) by mouth once daily.      cetirizine (ZYRTEC) 10 MG tablet Take 10 mg by mouth daily as needed. ZYRTEC ALLERGY 10 MG TABS      cholecalciferol, vitamin D3, 125 mcg (5,000 unit) Tab Take 5,000 Units by mouth once daily.      EPINEPHrine (EPIPEN) 0.3 mg/0.3 mL AtIn Inject 0.6 mLs (0.6 mg total) into the muscle once as needed (allergic reaction). 2 Device 0    EScitalopram oxalate (LEXAPRO) 10 MG tablet Take 1 tablet (10 mg total) by mouth once daily. 90 tablet 3    ferrous sulfate 325 (65 FE) MG EC tablet Take 1 tablet (325 mg total) by mouth every 7 days. 30 tablet 6    fexofenadine (ALLEGRA) 180 MG tablet Take 180 mg by mouth daily as needed.      furosemide (LASIX) 40 MG tablet TAKE 1 TABLET (40 MG TOTAL) BY MOUTH ONCE DAILY. 90 tablet 0    ibuprofen  (ADVIL,MOTRIN) 200 MG tablet Take 200 mg by mouth as needed. ADVIL 200 MG TABS      lansoprazole (PREVACID) 30 MG capsule Take 30 mg by mouth daily as needed.      loratadine (CLARITIN) 10 mg tablet Take 10 mg by mouth once daily. (Patient taking differently: Take 10 mg by mouth daily as needed.)      omeprazole (PRILOSEC) 20 MG capsule Take 20 mg by mouth daily as needed.      potassium chloride (KLOR-CON) 10 MEQ TbSR TAKE 1 TABLET (10 MEQ TOTAL) BY MOUTH ONCE DAILY ON DAYS YOU TAKE LASIX (FUROSEMIDE) 90 tablet 3    glucosamine-chondroitin 500-400 mg tablet Take 1 tablet by mouth 3 (three) times daily. Once a week (Patient not taking: Reported on 5/16/2025)      Lactobac no.41/Bifidobact no.7 (PROBIOTIC-10 ORAL) Take by mouth once daily. (Patient not taking: Reported on 5/16/2025)       No current facility-administered medications on file prior to visit.

## 2025-06-05 ENCOUNTER — RESULTS FOLLOW-UP (OUTPATIENT)
Dept: FAMILY MEDICINE | Facility: CLINIC | Age: 50
End: 2025-06-05

## 2025-06-05 ENCOUNTER — OFFICE VISIT (OUTPATIENT)
Dept: FAMILY MEDICINE | Facility: CLINIC | Age: 50
End: 2025-06-05
Payer: COMMERCIAL

## 2025-06-05 ENCOUNTER — LAB VISIT (OUTPATIENT)
Dept: LAB | Facility: HOSPITAL | Age: 50
End: 2025-06-05
Payer: COMMERCIAL

## 2025-06-05 ENCOUNTER — PATIENT MESSAGE (OUTPATIENT)
Dept: FAMILY MEDICINE | Facility: CLINIC | Age: 50
End: 2025-06-05

## 2025-06-05 VITALS
SYSTOLIC BLOOD PRESSURE: 136 MMHG | OXYGEN SATURATION: 96 % | WEIGHT: 315 LBS | HEART RATE: 90 BPM | DIASTOLIC BLOOD PRESSURE: 78 MMHG | HEIGHT: 69 IN | BODY MASS INDEX: 46.65 KG/M2 | RESPIRATION RATE: 18 BRPM

## 2025-06-05 DIAGNOSIS — E11.65 UNCONTROLLED TYPE 2 DIABETES MELLITUS WITH HYPERGLYCEMIA: Primary | ICD-10-CM

## 2025-06-05 DIAGNOSIS — D64.9 NORMOCYTIC ANEMIA: ICD-10-CM

## 2025-06-05 DIAGNOSIS — R63.1 POLYDIPSIA: ICD-10-CM

## 2025-06-05 DIAGNOSIS — R20.2 PARESTHESIA: ICD-10-CM

## 2025-06-05 LAB
ABSOLUTE EOSINOPHIL (OHS): 0.54 K/UL
ABSOLUTE MONOCYTE (OHS): 0.74 K/UL (ref 0.3–1)
ABSOLUTE NEUTROPHIL COUNT (OHS): 6.72 K/UL (ref 1.8–7.7)
ALBUMIN SERPL BCP-MCNC: 3.6 G/DL (ref 3.5–5.2)
ALP SERPL-CCNC: 149 UNIT/L (ref 40–150)
ALT SERPL W/O P-5'-P-CCNC: 61 UNIT/L (ref 10–44)
ANION GAP (OHS): 13 MMOL/L (ref 8–16)
AST SERPL-CCNC: 48 UNIT/L (ref 11–45)
BACTERIA #/AREA URNS HPF: ABNORMAL /HPF
BASOPHILS # BLD AUTO: 0.13 K/UL
BASOPHILS NFR BLD AUTO: 1 %
BILIRUB SERPL-MCNC: 0.7 MG/DL (ref 0.1–1)
BILIRUB UR QL STRIP.AUTO: NEGATIVE
BUN SERPL-MCNC: 19 MG/DL (ref 6–20)
CALCIUM SERPL-MCNC: 9.2 MG/DL (ref 8.7–10.5)
CHLORIDE SERPL-SCNC: 98 MMOL/L (ref 95–110)
CLARITY UR: CLEAR
CO2 SERPL-SCNC: 23 MMOL/L (ref 23–29)
COLOR UR AUTO: YELLOW
CREAT SERPL-MCNC: 0.8 MG/DL (ref 0.5–1.4)
EAG (OHS): 269 MG/DL (ref 68–131)
ERYTHROCYTE [DISTWIDTH] IN BLOOD BY AUTOMATED COUNT: 13.9 % (ref 11.5–14.5)
GFR SERPLBLD CREATININE-BSD FMLA CKD-EPI: >60 ML/MIN/1.73/M2
GLUCOSE SERPL-MCNC: 325 MG/DL (ref 70–110)
GLUCOSE UR QL STRIP: ABNORMAL
HBA1C MFR BLD: 11 % (ref 4–5.6)
HCT VFR BLD AUTO: 47.4 % (ref 40–54)
HGB BLD-MCNC: 14.8 GM/DL (ref 14–18)
HGB UR QL STRIP: NEGATIVE
IMM GRANULOCYTES # BLD AUTO: 0.05 K/UL (ref 0–0.04)
IMM GRANULOCYTES NFR BLD AUTO: 0.4 % (ref 0–0.5)
KETONES UR QL STRIP: ABNORMAL
LEUKOCYTE ESTERASE UR QL STRIP: NEGATIVE
LYMPHOCYTES # BLD AUTO: 4.31 K/UL (ref 1–4.8)
MCH RBC QN AUTO: 25.2 PG (ref 27–31)
MCHC RBC AUTO-ENTMCNC: 31.2 G/DL (ref 32–36)
MCV RBC AUTO: 81 FL (ref 82–98)
MICROSCOPIC COMMENT: ABNORMAL
NITRITE UR QL STRIP: NEGATIVE
NUCLEATED RBC (/100WBC) (OHS): 0 /100 WBC
PH UR STRIP: 6 [PH]
PLATELET # BLD AUTO: 283 K/UL (ref 150–450)
PMV BLD AUTO: 12 FL (ref 9.2–12.9)
POTASSIUM SERPL-SCNC: 3.9 MMOL/L (ref 3.5–5.1)
PROT SERPL-MCNC: 7.6 GM/DL (ref 6–8.4)
PROT UR QL STRIP: NEGATIVE
RBC # BLD AUTO: 5.88 M/UL (ref 4.6–6.2)
RBC #/AREA URNS HPF: 1 /HPF (ref 0–4)
RELATIVE EOSINOPHIL (OHS): 4.3 %
RELATIVE LYMPHOCYTE (OHS): 34.5 % (ref 18–48)
RELATIVE MONOCYTE (OHS): 5.9 % (ref 4–15)
RELATIVE NEUTROPHIL (OHS): 53.9 % (ref 38–73)
SODIUM SERPL-SCNC: 134 MMOL/L (ref 136–145)
SP GR UR STRIP: 1.02
SQUAMOUS #/AREA URNS HPF: 1 /HPF
WBC # BLD AUTO: 12.49 K/UL (ref 3.9–12.7)
WBC #/AREA URNS HPF: 3 /HPF (ref 0–5)
YEAST URNS QL MICRO: ABNORMAL /HPF

## 2025-06-05 PROCEDURE — 83036 HEMOGLOBIN GLYCOSYLATED A1C: CPT

## 2025-06-05 PROCEDURE — 99214 OFFICE O/P EST MOD 30 MIN: CPT | Mod: S$GLB,,, | Performed by: STUDENT IN AN ORGANIZED HEALTH CARE EDUCATION/TRAINING PROGRAM

## 2025-06-05 PROCEDURE — 1160F RVW MEDS BY RX/DR IN RCRD: CPT | Mod: CPTII,S$GLB,, | Performed by: STUDENT IN AN ORGANIZED HEALTH CARE EDUCATION/TRAINING PROGRAM

## 2025-06-05 PROCEDURE — G2211 COMPLEX E/M VISIT ADD ON: HCPCS | Mod: S$GLB,,, | Performed by: STUDENT IN AN ORGANIZED HEALTH CARE EDUCATION/TRAINING PROGRAM

## 2025-06-05 PROCEDURE — 1159F MED LIST DOCD IN RCRD: CPT | Mod: CPTII,S$GLB,, | Performed by: STUDENT IN AN ORGANIZED HEALTH CARE EDUCATION/TRAINING PROGRAM

## 2025-06-05 PROCEDURE — 80053 COMPREHEN METABOLIC PANEL: CPT

## 2025-06-05 PROCEDURE — 36415 COLL VENOUS BLD VENIPUNCTURE: CPT | Mod: PO

## 2025-06-05 PROCEDURE — 81003 URINALYSIS AUTO W/O SCOPE: CPT | Mod: PO

## 2025-06-05 PROCEDURE — 3075F SYST BP GE 130 - 139MM HG: CPT | Mod: CPTII,S$GLB,, | Performed by: STUDENT IN AN ORGANIZED HEALTH CARE EDUCATION/TRAINING PROGRAM

## 2025-06-05 PROCEDURE — 3078F DIAST BP <80 MM HG: CPT | Mod: CPTII,S$GLB,, | Performed by: STUDENT IN AN ORGANIZED HEALTH CARE EDUCATION/TRAINING PROGRAM

## 2025-06-05 PROCEDURE — 85025 COMPLETE CBC W/AUTO DIFF WBC: CPT

## 2025-06-05 PROCEDURE — 3008F BODY MASS INDEX DOCD: CPT | Mod: CPTII,S$GLB,, | Performed by: STUDENT IN AN ORGANIZED HEALTH CARE EDUCATION/TRAINING PROGRAM

## 2025-06-05 PROCEDURE — 99999 PR PBB SHADOW E&M-EST. PATIENT-LVL V: CPT | Mod: PBBFAC,,, | Performed by: STUDENT IN AN ORGANIZED HEALTH CARE EDUCATION/TRAINING PROGRAM

## 2025-06-05 PROCEDURE — 3046F HEMOGLOBIN A1C LEVEL >9.0%: CPT | Mod: CPTII,S$GLB,, | Performed by: STUDENT IN AN ORGANIZED HEALTH CARE EDUCATION/TRAINING PROGRAM

## 2025-06-05 RX ORDER — SYRINGE,SAFETY WITH NEEDLE,1ML 25GX1"
1 SYRINGE (EA) MISCELLANEOUS NIGHTLY
Qty: 100 EACH | Refills: 0 | Status: SHIPPED | OUTPATIENT
Start: 2025-06-05

## 2025-06-05 RX ORDER — LANCETS
EACH MISCELLANEOUS
Qty: 200 EACH | Refills: 0 | Status: SHIPPED | OUTPATIENT
Start: 2025-06-05

## 2025-06-05 RX ORDER — INSULIN PUMP SYRINGE, 3 ML
EACH MISCELLANEOUS
Qty: 1 EACH | Refills: 0 | Status: SHIPPED | OUTPATIENT
Start: 2025-06-05 | End: 2026-06-05

## 2025-06-05 RX ORDER — METFORMIN HYDROCHLORIDE 500 MG/1
1000 TABLET, EXTENDED RELEASE ORAL 2 TIMES DAILY WITH MEALS
Qty: 360 TABLET | Refills: 3 | Status: SHIPPED | OUTPATIENT
Start: 2025-06-05 | End: 2026-06-05

## 2025-06-05 RX ORDER — INSULIN GLARGINE 100 [IU]/ML
10 INJECTION, SOLUTION SUBCUTANEOUS NIGHTLY
Qty: 15 ML | Refills: 1 | Status: SHIPPED | OUTPATIENT
Start: 2025-06-05 | End: 2025-06-06

## 2025-06-06 ENCOUNTER — TELEPHONE (OUTPATIENT)
Dept: FAMILY MEDICINE | Facility: CLINIC | Age: 50
End: 2025-06-06
Payer: COMMERCIAL

## 2025-06-06 ENCOUNTER — PATIENT MESSAGE (OUTPATIENT)
Dept: FAMILY MEDICINE | Facility: CLINIC | Age: 50
End: 2025-06-06
Payer: COMMERCIAL

## 2025-06-06 RX ORDER — INSULIN DEGLUDEC 100 U/ML
10 INJECTION, SOLUTION SUBCUTANEOUS NIGHTLY
Qty: 15 ML | Refills: 1 | Status: SHIPPED | OUTPATIENT
Start: 2025-06-06 | End: 2025-06-12

## 2025-06-06 NOTE — TELEPHONE ENCOUNTER
Lantus are not covered. Pt will need a medication that is covered like Toujeo or Tresiba which is cover per patient.

## 2025-06-09 ENCOUNTER — PATIENT MESSAGE (OUTPATIENT)
Dept: FAMILY MEDICINE | Facility: CLINIC | Age: 50
End: 2025-06-09
Payer: COMMERCIAL

## 2025-06-11 ENCOUNTER — PATIENT MESSAGE (OUTPATIENT)
Dept: FAMILY MEDICINE | Facility: CLINIC | Age: 50
End: 2025-06-11
Payer: COMMERCIAL

## 2025-06-11 DIAGNOSIS — R20.2 PARESTHESIA: ICD-10-CM

## 2025-06-11 DIAGNOSIS — E11.65 UNCONTROLLED TYPE 2 DIABETES MELLITUS WITH HYPERGLYCEMIA: Primary | ICD-10-CM

## 2025-06-11 DIAGNOSIS — D64.9 NORMOCYTIC ANEMIA: ICD-10-CM

## 2025-06-11 DIAGNOSIS — E61.1 LOW IRON: ICD-10-CM

## 2025-06-12 ENCOUNTER — CLINICAL SUPPORT (OUTPATIENT)
Dept: DIABETES | Facility: CLINIC | Age: 50
End: 2025-06-12
Payer: COMMERCIAL

## 2025-06-12 ENCOUNTER — PATIENT MESSAGE (OUTPATIENT)
Dept: DIABETES | Facility: CLINIC | Age: 50
End: 2025-06-12

## 2025-06-12 ENCOUNTER — TELEPHONE (OUTPATIENT)
Dept: FAMILY MEDICINE | Facility: CLINIC | Age: 50
End: 2025-06-12
Payer: COMMERCIAL

## 2025-06-12 ENCOUNTER — RESULTS FOLLOW-UP (OUTPATIENT)
Dept: FAMILY MEDICINE | Facility: CLINIC | Age: 50
End: 2025-06-12

## 2025-06-12 ENCOUNTER — LAB VISIT (OUTPATIENT)
Dept: LAB | Facility: HOSPITAL | Age: 50
End: 2025-06-12
Attending: STUDENT IN AN ORGANIZED HEALTH CARE EDUCATION/TRAINING PROGRAM
Payer: COMMERCIAL

## 2025-06-12 VITALS — WEIGHT: 315 LBS | HEIGHT: 69 IN | BODY MASS INDEX: 46.65 KG/M2

## 2025-06-12 DIAGNOSIS — E11.65 UNCONTROLLED TYPE 2 DIABETES MELLITUS WITH HYPERGLYCEMIA: ICD-10-CM

## 2025-06-12 DIAGNOSIS — E11.9 NEWLY DIAGNOSED DIABETES: Primary | ICD-10-CM

## 2025-06-12 DIAGNOSIS — R63.1 POLYDIPSIA: ICD-10-CM

## 2025-06-12 DIAGNOSIS — E11.65 UNCONTROLLED TYPE 2 DIABETES MELLITUS WITH HYPERGLYCEMIA: Primary | ICD-10-CM

## 2025-06-12 DIAGNOSIS — R20.2 PARESTHESIA: ICD-10-CM

## 2025-06-12 DIAGNOSIS — E61.1 LOW IRON: ICD-10-CM

## 2025-06-12 DIAGNOSIS — D64.9 NORMOCYTIC ANEMIA: ICD-10-CM

## 2025-06-12 LAB
ALBUMIN SERPL BCP-MCNC: 3.5 G/DL (ref 3.5–5.2)
ALP SERPL-CCNC: 131 UNIT/L (ref 40–150)
ALT SERPL W/O P-5'-P-CCNC: 59 UNIT/L (ref 10–44)
ANION GAP (OHS): 8 MMOL/L (ref 8–16)
AST SERPL-CCNC: 39 UNIT/L (ref 11–45)
BILIRUB SERPL-MCNC: 0.6 MG/DL (ref 0.1–1)
BUN SERPL-MCNC: 15 MG/DL (ref 6–20)
C PEPTIDE SERPL-MCNC: 6.92 NG/ML (ref 0.78–5.19)
CALCIUM SERPL-MCNC: 8.8 MG/DL (ref 8.7–10.5)
CHLORIDE SERPL-SCNC: 100 MMOL/L (ref 95–110)
CO2 SERPL-SCNC: 25 MMOL/L (ref 23–29)
CREAT SERPL-MCNC: 0.7 MG/DL (ref 0.5–1.4)
FERRITIN SERPL-MCNC: 55 NG/ML (ref 20–300)
FOLATE SERPL-MCNC: 13.4 NG/ML (ref 4–24)
GFR SERPLBLD CREATININE-BSD FMLA CKD-EPI: >60 ML/MIN/1.73/M2
GLUCOSE SERPL-MCNC: 333 MG/DL (ref 70–110)
IRON SATN MFR SERPL: 11 % (ref 20–50)
IRON SERPL-MCNC: 48 UG/DL (ref 45–160)
MAGNESIUM SERPL-MCNC: 1.9 MG/DL (ref 1.6–2.6)
POTASSIUM SERPL-SCNC: 4.1 MMOL/L (ref 3.5–5.1)
PROT SERPL-MCNC: 7.1 GM/DL (ref 6–8.4)
SODIUM SERPL-SCNC: 133 MMOL/L (ref 136–145)
TIBC SERPL-MCNC: 453 UG/DL (ref 250–450)
TRANSFERRIN SERPL-MCNC: 306 MG/DL (ref 200–375)
VIT B12 SERPL-MCNC: 1144 PG/ML (ref 210–950)

## 2025-06-12 PROCEDURE — 84681 ASSAY OF C-PEPTIDE: CPT

## 2025-06-12 PROCEDURE — 83540 ASSAY OF IRON: CPT

## 2025-06-12 PROCEDURE — 83735 ASSAY OF MAGNESIUM: CPT

## 2025-06-12 PROCEDURE — 86341 ISLET CELL ANTIBODY: CPT | Mod: 59

## 2025-06-12 PROCEDURE — 82746 ASSAY OF FOLIC ACID SERUM: CPT

## 2025-06-12 PROCEDURE — 80053 COMPREHEN METABOLIC PANEL: CPT

## 2025-06-12 PROCEDURE — G0108 DIAB MANAGE TRN  PER INDIV: HCPCS | Mod: S$GLB,,, | Performed by: DIETITIAN, REGISTERED

## 2025-06-12 PROCEDURE — 99999 PR PBB SHADOW E&M-EST. PATIENT-LVL II: CPT | Mod: PBBFAC,,, | Performed by: DIETITIAN, REGISTERED

## 2025-06-12 PROCEDURE — 36415 COLL VENOUS BLD VENIPUNCTURE: CPT | Mod: PO

## 2025-06-12 PROCEDURE — 86341 ISLET CELL ANTIBODY: CPT

## 2025-06-12 PROCEDURE — 82607 VITAMIN B-12: CPT

## 2025-06-12 PROCEDURE — 82728 ASSAY OF FERRITIN: CPT

## 2025-06-12 RX ORDER — INSULIN DEGLUDEC 100 U/ML
15 INJECTION, SOLUTION SUBCUTANEOUS NIGHTLY
Qty: 15 ML | Refills: 1 | Status: SHIPPED | OUTPATIENT
Start: 2025-06-12 | End: 2025-06-12

## 2025-06-12 RX ORDER — INSULIN LISPRO 100 [IU]/ML
5 INJECTION, SOLUTION INTRAVENOUS; SUBCUTANEOUS
Qty: 15 ML | Refills: 2 | Status: SHIPPED | OUTPATIENT
Start: 2025-06-12 | End: 2025-06-12

## 2025-06-12 RX ORDER — INSULIN LISPRO 100 [IU]/ML
INJECTION, SOLUTION INTRAVENOUS; SUBCUTANEOUS
Qty: 15 ML | Refills: 2 | Status: SHIPPED | OUTPATIENT
Start: 2025-06-12

## 2025-06-12 RX ORDER — INSULIN DEGLUDEC 100 U/ML
INJECTION, SOLUTION SUBCUTANEOUS
Qty: 15 ML | Refills: 4 | Status: SHIPPED | OUTPATIENT
Start: 2025-06-12

## 2025-06-12 NOTE — PROGRESS NOTES
"Diabetes Care Specialist Progress Note  Author: Dolores Fraire RD, [facility]  Date: 6/12/2025    Intake    Program Intake  Reason for Diabetes Program Visit:: Initial Diabetes Assessment  Current diabetes risk level:: low  In the last month, have you used the ER or been admitted to the hospital: No  Permission to speak with others about care:: yes (spouse Cheyenne--she is a nurse, not with patient for visit today)    Current Diabetes Treatment: Insulin, Oral Medications  Oral Medication Type/Dose: metformin  mg (1 tablet twice daily)--just increased to twice daily today  Method of insulin delivery?: Injections  Injection Type: Pens  Pen Type/Dose: Tresiba 15 units QHS--increased from 10 units a few days ago per PCP    Continuous Glucose Monitoring  Patient has CGM: No (plans to initiate Dexcom G7 in next few days--waiting on pharmacy to fill Rx)    Lab Results   Component Value Date    HGBA1C 11.0 (H) 06/05/2025     Weight: (!) 173 kg (381 lb 6.3 oz)   Height: 5' 9" (175.3 cm)   Body mass index is 56.32 kg/m².    Lifestyle Coping Support & Clinical    Lifestyle/Coping/Support  Compared to other people your age, how would you rate your health?: Good  Does anyone in your family have diabetes or does anyone in your family support you in your diabetes care?: Great grandmother and uncle with diabetes. Patient and wife both support diabetes care.  List anything about Diabetes that causes you stress?: Newly diagnosed diabetes and wanting to manage  How do you deal with stress/distress?: sees a counselor  Learning Barriers:: None  Culture or [place]ian beliefs that may impact ability to access healthcare: No  Psychosocial/Coping Skills Assessment Completed: : Yes  Assessment indicates:: Adequate understanding  Area of need?: No    Problem Review  Active Comorbidities: Obesity    Diabetes Self-Management Skills Assessment    Medication Skills Assessment  Patient is able to identify current diabetes medications, dosages, " and appropriate timing of medications.: yes  Patient reports problems or concerns with current medication regimen.: yes  Medication regimen problems/concerns:: does not feel like regimen is working  Patient is  aware that some diabetes medications can cause low blood sugar?: Yes  Medication Skills Assessment Completed:: Yes  Assessment indicates:: Instruction Needed  Area of need?: Yes    Diabetes Disease Process/Treatment Options  Diabetes Type?: Type II  When were you diagnosed?: last week with labs-Hgb A1c 11% (6/5/25)  What are your goals for this education session?: Learn more about diagnosis and managing diabetes  Is patient aware of what causes diabetes?: Yes  Does patient understand the pathophysiology of diabetes?: Yes  Diabetes Disease Process/Treatment Options: Skills Assessment Completed: Yes  Assessment indicates:: Adequate understanding  Area of need?: No    Nutrition/Healthy Eating  Meal Plan 24 Hour Recall - Breakfast: scrambled eggs, peppers, tomato/onion, sausage, 1 slice low carb bread  Meal Plan 24 Hour Recall - Lunch: tukey sandwich with leslie & tomato on 2 slices low carb bread (18 gm total)  Meal Plan 24 Hour Recall - Dinner: hamburger alfred (no bun) with cheese, tomato, lettuce, mustard, pickles  Meal Plan 24 Hour Recall - Snack: likes crunchy/salty snacks: pork skins, boiled egg, cheese, low carb wrap with cheese  Meal Plan 24 Hour Recall - Beverage: water, sparkling water  Who shops/cooks?: patient does cooking and shopping  Patient can identify foods that impact blood sugar.: yes  Challenges to healthy eating:: portion control, snacking between meals and at night  Nutrition/Healthy Eating Skills Assessment Completed:: Yes  Assessment indicates:: Instruction Needed  Area of need?: Yes    Physical Activity/Exercise  Patient's daily activity level:: lightly active  Patient formally exercises outside of work.: yes  Frequency: three times a week (has started walking on home treadmill for 15  minutes, also has home stationary bicycle)  Patient can identify forms of physical activity.: yes  Physical Activity/Exercise Skills Assessment Completed: : Yes  Assessment indicates:: Adequate understanding  Area of need?: No    Home Blood Glucose Monitoring  Patient states that blood sugar is checked at home daily.: yes  Monitoring Method:: home glucometer  Home glucometer meter type:: [place] glucometer purchased from online not long ago  Fasting BG range history:: reviewed glucose readings--fasting glucose levels 319-330s  Premeal BG range history:: all readings > 300 mg/dL (1 episode of glulcose of 240 mg/dL and patient felt symptomatic)  What are your blood glucose targets?:  mg/dL before meals  How often do you check your blood sugar?: 3 times daily before meals  What is your A1c Target?: <6.5%  Home Blood Glucose Monitoring Skills Assessment Completed: : Yes  Assessment indicates:: Instruction Needed  Area of need?: Yes    Acute Complications  Acute Complications Skills Assessment Completed: : No  Deferred due to:: Time    Chronic Complications  Chronic Complications Skills Assessment Completed: : No  Deferred due to:: Time    Assessment Summary and Plan    Based on today's diabetes care assessment, the following areas of need were identified:      Identified Areas of Need      Medication/Current Diabetes Treatment: Yes--see care plan   Lifestyle Coping/Support: No   Diabetes Disease Process/Treatment Options: No--newly diagnosed with Hgb A1c of 11% last week.  Discussed pathology of Type 2 diabetes, risk factors and treatment options.     Nutrition/Healthy Eating: Yes --see care plan   Physical Activity/Exercise: No    Home Blood Glucose Monitoring: Yes --see care plan     Today's interventions were provided through individual discussion, instruction, and written materials were provided.      Patient verbalized understanding of instruction and written materials.  Pt was able to return back  demonstration of instructions today. Patient understood key points, needs reinforcement and further instruction.     Diabetes Self-Management Care Plan:    Today's Diabetes Self-Management Care Plan was developed with Conradmerlyn's input. Josef has agreed to work toward the following goal(s) to improve his/her overall diabetes control.      Care Plan: Diabetes Management   Updates made since 6/12/2024 12:00 AM        Problem: Medications         Goal: Due to continued hyperglycemia--PCP (Manfred) will increase Tresiba to 20 units QHS and initiate Humalog/Novolog at meals 5 units + correction scale (150/50)    Start Date: 6/12/2025   Expected End Date: 6/19/2025   Priority: High   Barriers: No Barriers Identified        Task: Reviewed with patient all current diabetes medications and provided basic review of the purpose, dosage, frequency, side effects, and storage of both oral and injectable diabetes medications. Completed 6/12/2025        Task: Instructed patient on how to self-administer Humalog/Novolog at meals + correction/sliding scale. Completed 6/12/2025   Note:      Correction scale to be used with rapid acting insulin only (Novolog, Humalog, etc) based on glucose levels BEFORE meals as directed:    150-200 mg/dL =  + 1 unit  201-250 mg/dL =  + 2 units  251-300 mg/dL =  + 3 units  301-350 mg/dL =  + 4 units  > 350 mg/dL =  + 5 units        Task: Discussed guidelines for preventing, detecting and treating hypoglycemia and hyperglycemia and reviewed the importance of meal and medication timing with diabetes mediations for prevention of hypoglycemia and maximum drug benefit. Completed 6/12/2025        Problem: Blood Glucose Self-Monitoring         Goal: Patient agrees to  continue to check glucose 3 times daily before meals--plans to initiate Dexcom G7 in next few days (pending pharmacy fulfillment).    Start Date: 6/12/2025   Expected End Date: 6/19/2025   Priority: Low   Barriers: Lack of Supplies    Note:    Patient waiting to  Dexcom G7 sensors--pharmacy does not have stock and states will be available in next few days.  Educated on use of Dexcom G7 and once he initiates, he is agreeable to share glucose data with provider.        Task: Reviewed the importance of self-monitoring blood glucose and keeping logs. Completed 6/12/2025        Task: Provided patient with blood glucose logs, reviewed appropriate timing and frequency to SMBG, education on parameters on when to notify provider and advised patient to bring logs to all appts with PCP/Endocrinologist/Diabetes Care Specialist. Completed 6/12/2025        Problem: Healthy Eating         Goal: Do not eliminate carbs completely from meals. Include at least 30-45 grams of total carbs at each meal incorporated with lean protein and non-starchy vegetables.    Start Date: 6/12/2025   Expected End Date: 6/19/2025   Priority: Medium   Barriers: No Barriers Identified        Task: Reviewed the sources and role of Carbohydrate, Protein, and Fat and how each nutrient impacts blood sugar. Completed 6/12/2025        Task: Discussed strategies for choosing healthier menu options when dining out. Completed 6/12/2025        Task: Recommended replacing beverages containing high sugar content with noncaloric/sugar free options and/or water. Completed 6/12/2025        Task: Review the importance of balancing carbohydrates with each meal using portion control techniques to count servings of carbohydrate and label reading to identify serving size and amount of total carbs per serving. Completed 6/12/2025        Follow Up Plan     Follow up in 1 week (on 6/19/2025 @ 7:30am) for glucose log/DM medication review. Assess acute and chronic complications of DM. Changes made to DM meds today per PCP due to continued global hyperglycemia--increase Tresiba from 15 to 20 units QHS and initiate Humalog/Novolog 5 units AC + correction scale (150/50). Continue metformin  mg  twice daily. Labs being drawn today to rule out Type 1 DM or ALEXANDRO diagnosis. Dexcom G7 approved but patient waiting on pharmacy to have stock to fill order--plans to initiate use in next 1-2 days. Reviewed diet--currently has omitted most carbs from diet and discussed today to not eliminate carbs completely from diet but to include moderate amounts at all meals with lean protein and non-starchy vegetables. Walking on treadmill 15 minutes some days--goal to increase to 30+ minutes of aerobic exercise daily as glucose control improves.      Today's care plan and follow up schedule was discussed with patient.  Josef verbalized understanding of the care plan, goals, and agrees to follow up plan.        The patient was encouraged to communicate with his/her health care provider/physician and care team regarding his/her condition(s) and treatment.  I provided the patient with my contact information today and encouraged to contact me via phone or [facility] Patient Portal as needed.     Length of Visit   Total Time: 60 Minutes

## 2025-06-12 NOTE — PROGRESS NOTES
Subjective:       Patient ID: Josef Soriano is a 49 y.o. male.    Chief Complaint: Blood Sugar Problem (Blood sugar high since tues A1c was 9,6 with home kit), Nausea (Took bentyl  sides affects), and Abdominal Pain    Nausea  Associated symptoms include abdominal pain and nausea.   Abdominal Pain  Associated symptoms include nausea.     49 year old male presents accompanied by his wife who contributes to the history. He has checked his hemoglobin A1c at home - they each do this once a year, his wife works in the lab - and learned his is elevated. He has been having vague abdominal pain for the last two weeks and some nausea. No vomiting. However, he has developed a very dry mouth with increased thirst and urination from previous. He bought a glucometer yesterday and blood sugar has gotten up to 401. He wonders how to tell if the glucometer is accurate. He wonders if recently being prescribed gabapentin could have contributed to developing diabetes. His last hemoglobin A1c was in the prediabetic range in the fall. The plan today will be to complete blood draw and urinalysis before the lab closes. I will update him on results and with next steps. For now we will start metformin low and slow 500 mg XR one tab every evening. Each week increased by one tab as tolerated to reach goal dosing of two tabs by mouth twice daily. If loose stools develop, go back to most recently tolerated dose and wait longer before trying to increase dose.    Past Medical History:   Diagnosis Date    Allergic rhinitis due to pollen 10/10/2013    Arthritis     Basal cell carcinoma (BCC) in situ of skin     Cancer 01/01/2009    germ cell tumor of thymus, also skin cancer    Depression     History of colon polyps        Past Surgical History:   Procedure Laterality Date    BASAL CELL CARCINOMA EXCISION  11/26/2024    on nose at [facility]    COLONOSCOPY N/A 11/24/2015    Procedure: COLONOSCOPY;  Surgeon: Yfn Sainz MD;   "Location: [facility] ENDO;  Service: Endoscopy;  Laterality: N/A; repeat in 5 years for surveillance    COLONOSCOPY N/A 09/26/2022    Procedure: COLONOSCOPY;  Surgeon: Yfn Sainz MD;  Location: [facility];  Service: Endoscopy;  Laterality: N/A;    MOLE REMOVAL  02/2016    SKIN CANCER EXCISION      Sebaceous cancer of face. Also BCE    SOFT TISSUE BIOPSY      thymus biopsy    TUMOR REMOVAL  2009    thymus- opened chest    WISDOM TOOTH EXTRACTION         Review of patient's allergies indicates:   Allergen Reactions    Venom-wasp Swelling and Other (See Comments)     Shortness of breath    Sulfa (sulfonamide antibiotics) Itching and Rash       Social History[1]    Medications Ordered Prior to Encounter[2]    Family History   Problem Relation Name Age of Onset    Cancer Mother          skin cancer    Alcohol abuse Mother      Drug abuse Mother      Stroke Mother      Other (dementia) Mother      Other (Other) Mother          victim of physical abuse in 20s    Colon cancer Neg Hx      Crohn's disease Neg Hx      Celiac disease Neg Hx      Ulcerative colitis Neg Hx         Review of Systems   Gastrointestinal:  Positive for abdominal pain and nausea.         Objective:      /78   Pulse 90   Resp 18   Ht 5' 9" (1.753 m)   Wt (!) 172.5 kg (380 lb 4.7 oz)   SpO2 96%   BMI 56.16 kg/m²   Physical Exam  Constitutional:       General: He is not in acute distress.     Appearance: He is not ill-appearing, toxic-appearing or diaphoretic.   Neurological:      General: No focal deficit present.      Mental Status: He is alert. Mental status is at baseline.   Psychiatric:         Mood and Affect: Mood normal.         Behavior: Behavior normal.         Assessment:       1. Uncontrolled type 2 diabetes mellitus with hyperglycemia    2. Polydipsia    3. Normocytic anemia    4. Paresthesia        Plan:       Uncontrolled type 2 diabetes mellitus with hyperglycemia  -     insulin degludec (TRESIBA FLEXTOUCH U-100) 100 unit/mL " (3 mL) insulin pen; Inject 10 Units into the skin every evening.  Dispense: 15 mL; Refill: 1  - I updated the patient through the portal with results consistent with diabetes with hyperglycemia and ketones in the urine. No anion gap. As patient remains able to tolerate oral fluids and has limited carbohydrates in his diet, we will start long acting insulin and reassess in the coming days.  - I spoke with him over the phone with hypoglycemia precautions. His wife is a long time nurse and is comfortable with giving insulin injections. He will check his blood sugar four times daily. If acute changes (eg. Vomitin) or blood sugar any higher than where he is, I recommend he go to the ER.  - I have referred to diabetes education and the patient is scheduled to return for nurse visit for injection education in the mean time.  - Yes, continuous glucose monitor is appropriate. I have placed that order.    Polydipsia  -     Comprehensive Metabolic Panel; Future; Expected date: 06/05/2025  -     Hemoglobin A1C; Future; Expected date: 06/05/2025  -     Cancel: Urinalysis, Reflex to Urine Culture Urine, Clean Catch  -     metFORMIN (GLUCOPHAGE-XR) 500 MG ER 24hr tablet; Take 2 tablets (1,000 mg total) by mouth 2 (two) times daily with meals.  Dispense: 360 tablet; Refill: 3  -     Cancel: POCT Glucose, Hand-Held Device  -     Ambulatory referral/consult to Diabetes Education; Future; Expected date: 06/12/2025  -     Urinalysis, Reflex to Urine Culture Urine, Clean Catch; Future; Expected date: 06/05/2025  - Plan as above.    Normocytic anemia  -     CBC Auto Differential; Future; Expected date: 06/05/2025  - Last seen 9/26/24 with hemoglobin 12.6. Recheck level. Up to date with colonoscopy 2022.    Paresthesia  -     CBC Auto Differential; Future; Expected date: 06/05/2025  - The patient discontinued gabapentin which was previously helpful making symptoms manageable, with concern that it may have contributed to high blood  sugar.      Close follow up.    Visit today included increased complexity associated with the care of the episodic problem polydipsia addressed and managing the longitudinal care of the patient due to the serious and/or complex managed problem(s) normocytic anemia.           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 Lives in [place] with his wife who is a cardiac nurse and their 16 year old daughter. Two pet cats.     Social Drivers of Health     Financial Resource Strain: Patient Declined (2024)    Overall Financial Resource Strain (CARDIA)     Difficulty of Paying Living Expenses: Patient declined   Food Insecurity: Patient Declined (2024)    Hunger Vital Sign     Worried About Running Out of Food in the Last Year: Patient declined     Ran Out of Food in the Last Year: Patient declined   Transportation Needs: No Transportation Needs (8/10/2023)    PRAPARE - Transportation     Lack of Transportation (Medical): No     Lack of Transportation (Non-Medical): No   Physical Activity: Insufficiently Active (2024)    Exercise Vital Sign     Days of Exercise per Week: 3 days     Minutes of Exercise per Session: 20 min   Stress: Patient Declined (2024)    [employer] - Occupational Stress Questionnaire     Feeling of Stress : Patient  declined   Housing Stability: Unknown (8/22/2024)    Housing Stability Vital Sign     Unable to Pay for Housing in the Last Year: Patient declined   [2]   Current Outpatient Medications on File Prior to Visit   Medication Sig Dispense Refill    cetirizine (ZYRTEC) 10 MG tablet Take 10 mg by mouth daily as needed. ZYRTEC ALLERGY 10 MG TABS      cholecalciferol, vitamin D3, 125 mcg (5,000 unit) Tab Take 5,000 Units by mouth once daily.      EScitalopram oxalate (LEXAPRO) 10 MG tablet Take 1 tablet (10 mg total) by mouth once daily. 90 tablet 3    ferrous sulfate 325 (65 FE) MG EC tablet Take 1 tablet (325 mg total) by mouth every 7 days. 30 tablet 6    fexofenadine (ALLEGRA) 180 MG tablet Take 180 mg by mouth daily as needed.      gabapentin (NEURONTIN) 300 MG capsule Take 1 capsule (300 mg total) by mouth 3 (three) times daily. 90 capsule 11    loratadine (CLARITIN) 10 mg tablet Take 10 mg by mouth once daily.      omeprazole (PRILOSEC) 20 MG capsule Take 20 mg by mouth daily as needed.      potassium chloride (KLOR-CON) 10 MEQ TbSR TAKE 1 TABLET (10 MEQ TOTAL) BY MOUTH ONCE DAILY ON DAYS YOU TAKE LASIX (FUROSEMIDE) 90 tablet 3    aspirin (ECOTRIN) 81 MG EC tablet Take 1 tablet (81 mg total) by mouth once daily. (Patient not taking: Reported on 6/5/2025)      EPINEPHrine (EPIPEN) 0.3 mg/0.3 mL AtIn Inject 0.6 mLs (0.6 mg total) into the muscle once as needed (allergic reaction). 2 Device 0    furosemide (LASIX) 40 MG tablet TAKE 1 TABLET (40 MG TOTAL) BY MOUTH ONCE DAILY. (Patient not taking: Reported on 6/5/2025) 90 tablet 0    glucosamine-chondroitin 500-400 mg tablet Take 1 tablet by mouth 3 (three) times daily. Once a week (Patient not taking: Reported on 6/5/2025)      ibuprofen (ADVIL,MOTRIN) 200 MG tablet Take 200 mg by mouth as needed. ADVIL 200 MG TABS (Patient not taking: Reported on 6/5/2025)      Lactobac no.41/Bifidobact no.7 (PROBIOTIC-10 ORAL) Take by mouth once daily. (Patient not taking: Reported on  5/16/2025)      lansoprazole (PREVACID) 30 MG capsule Take 30 mg by mouth daily as needed. (Patient not taking: Reported on 6/5/2025)       No current facility-administered medications on file prior to visit.

## 2025-06-12 NOTE — TELEPHONE ENCOUNTER
Edited prescriptions for dose adjustment on Tresiba and to include sliding/correction scale for insulin lispro.

## 2025-06-12 NOTE — TELEPHONE ENCOUNTER
Patient with global hyperglycemia after initiating basal insulin and new dx of Type 2 diabetes last week.

## 2025-06-15 LAB — GAD65 AB SER-SCNC: 0 NMOL/L

## 2025-06-16 DIAGNOSIS — E11.65 UNCONTROLLED TYPE 2 DIABETES MELLITUS WITH HYPERGLYCEMIA: ICD-10-CM

## 2025-06-16 RX ORDER — INSULIN DEGLUDEC 100 U/ML
INJECTION, SOLUTION SUBCUTANEOUS
Start: 2025-06-16

## 2025-06-16 RX ORDER — INSULIN LISPRO 100 [IU]/ML
INJECTION, SOLUTION INTRAVENOUS; SUBCUTANEOUS
Start: 2025-06-16 | End: 2025-06-19 | Stop reason: DRUGHIGH

## 2025-06-16 NOTE — TELEPHONE ENCOUNTER
Patient newly diagnosed with diabetes last week and initiate basal/bolus regimen and Dexcom G7.    See Dexcom G7 attached to this encounter in Media. Diabetes medications last adjusted Thursday 6/12/25 yet glucose levels remain > 250 mg/dL consistently.      Discussed with Dr. Shearer (PCP) and following recommendations to DM medications agreed:    Increased Tresiba from 20 to 40 units QHS  Increase Novolog 5 units AC to 8 units AC + correction (150/50)  Continue metformin  mg twice daily.    Spoke with patient to advise of medication changes and he verbalizes understanding and will initiate new doses today.  Next follow up with diabetes care specialist Thursday 6/19/25 and he will be reassessed at that time.

## 2025-06-16 NOTE — TELEPHONE ENCOUNTER
No care due was identified.  [facility] Embedded Care Due Messages. Reference number: [id number].   6/16/2025 12:46:29 PM CDT

## 2025-06-17 ENCOUNTER — PATIENT MESSAGE (OUTPATIENT)
Dept: DIABETES | Facility: CLINIC | Age: 50
End: 2025-06-17
Payer: COMMERCIAL

## 2025-06-17 LAB — W ISLET CELL IGG CYTO AUTOABS: NORMAL

## 2025-06-19 ENCOUNTER — CLINICAL SUPPORT (OUTPATIENT)
Dept: DIABETES | Facility: CLINIC | Age: 50
End: 2025-06-19
Payer: COMMERCIAL

## 2025-06-19 VITALS — HEIGHT: 69 IN | WEIGHT: 315 LBS | BODY MASS INDEX: 46.65 KG/M2

## 2025-06-19 DIAGNOSIS — E11.9 NEWLY DIAGNOSED DIABETES: Primary | ICD-10-CM

## 2025-06-19 DIAGNOSIS — E11.9 TYPE 2 DIABETES MELLITUS WITHOUT COMPLICATION, WITHOUT LONG-TERM CURRENT USE OF INSULIN: ICD-10-CM

## 2025-06-19 DIAGNOSIS — E11.65 UNCONTROLLED TYPE 2 DIABETES MELLITUS WITH HYPERGLYCEMIA: ICD-10-CM

## 2025-06-19 PROCEDURE — 99999 PR PBB SHADOW E&M-EST. PATIENT-LVL II: CPT | Mod: PBBFAC,,, | Performed by: DIETITIAN, REGISTERED

## 2025-06-19 PROCEDURE — G0108 DIAB MANAGE TRN  PER INDIV: HCPCS | Mod: S$GLB,,, | Performed by: DIETITIAN, REGISTERED

## 2025-06-19 RX ORDER — INSULIN LISPRO 100 [IU]/ML
INJECTION, SOLUTION INTRAVENOUS; SUBCUTANEOUS
Start: 2025-06-19

## 2025-06-19 NOTE — TELEPHONE ENCOUNTER
Patient seen for diabetes education follow up after new diagnosis of uncontrolled DM and basal/bolus regimen initiated last week.  Labs confirm Type 2 and discussed with PCP initiating GLP-1  Pended Mounjaro to preferred pharmacy--patient in past tried Ozempic and failed due to GI side effects.     Will continue Tresiba 40 units QHS and increase Humalog from 8 to 10 units before meals + correction/sliding scale (150/50).    Will pull Dexcom report in 1 week for continued review of glucose control.

## 2025-06-19 NOTE — TELEPHONE ENCOUNTER
No care due was identified.  [facility] Embedded Care Due Messages. Reference number: [id number].   6/19/2025 8:28:13 AM CDT

## 2025-06-19 NOTE — PROGRESS NOTES
"Diabetes Care Specialist Follow-up Note  Author: Dolores Fraire RD, [facility]  Date: 6/19/2025    Intake    Program Intake  Reason for Diabetes Program Visit:: Intervention  Type of Intervention:: Individual  Individual: Education  Education: Pattern Management  Current diabetes risk level:: moderate  In the last month, have you used the ER or been admitted to the hospital: No  Permission to speak with others about care:: yes (daughter Laurie is with patient today)    Current Diabetes Treatment: Insulin, Oral Medications  Oral Medication Type/Dose: metformin  mg (1 tablet twice daily)--will be increasing to 1 tablet in morning and 2 tablets in evening tonight  Method of insulin delivery?: Injections  Injection Type: Pens  Pen Type/Dose: Tresiba 40 units QHS, Humlaog 8 units AC + correction/sliding scale (150/50)    Continuous Glucose Monitoring  Patient has CGM: Yes  Personal CGM type:: Dexcom G7 (just initiated 1 week ago)    Lab Results   Component Value Date    HGBA1C 11.0 (H) 06/05/2025     Weight: (!) 174 kg (383 lb 9.6 oz)   Height: 5' 9" (175.3 cm)   Body mass index is 56.65 kg/m².  Wt stable since last visit on 6/12/2025    Physical activity/Exercise:   Riding stationary bicycle 15-16 minutes at lunch 5-7 days a week.  Plans to restart doing yard work now that feeling better.     Glucose Monitoring: Dexcom G7 download (6/13/2025 to 6/19/2025): See media file for full report--only 1 week CGM data available as patient only recently initiated Dexcom G7. No glucose values are in target range currently but glucose control is improving as DM medications are adjusted. Glucose levels now consistently < 300 mg/dL.  No episodes of hypoglycemia noted in the download.        Diabetes Self-Management Skills Assessment    Medication Skills Assessment  Patient is able to identify current diabetes medications, dosages, and appropriate timing of medications.: yes  Patient reports problems or concerns with current " medication regimen.: yes  Medication regimen problems/concerns:: concerned about side effects  Patient is  aware that some diabetes medications can cause low blood sugar?: Yes  Medication Skills Assessment Completed:: Yes  Assessment indicates:: Instruction Needed  Area of need?: Yes    Nutrition/Healthy Eating  Meal Plan 24 Hour Recall - Breakfast: eggs/sausage, 1 slice keto toast (10 grams total carbs)  Meal Plan 24 Hour Recall - Lunch: turkey on 2 slices keto bread (20 gm CHO) + skinny pop (8 gm CHO), salad (lettuce/tomato, olives, cheese, oil & vinegar)  Meal Plan 24 Hour Recall - Dinner: cheeseburger on bun (30 gm CHO), 1/2 cup artichoke hearts  Meal Plan 24 Hour Recall - Snack: boiled egg, cheese, pork skins  Meal Plan 24 Hour Recall - Beverage: water, no sugar tea, zero sugar soda  Nutrition/Healthy Eating Skills Assessment Completed:: Yes  Assessment indicates:: Adequate understanding  Area of need?: No    Home Blood Glucose Monitoring  Patient states that blood sugar is checked at home daily.: yes  Monitoring Method:: personal continuous glucose monitor  Personal CGM type:: Dexcom G7 (just initiated 1 week ago)   What is your current Time in Range?: 0%--continuing to titrate up DM medications with PCP  What is your A1c Target?: <6.5% without hypoglycemia  Home Blood Glucose Monitoring Skills Assessment Completed: : Yes  Assessment indicates:: Adequate understanding  Area of need?: No    Acute Complications  Have you ever had hypoglycemia (low BG 70 or less)?: no  Do you know the symptoms of low blood sugar and how to treat?: Patient aware of symptoms--gets odd feeling in his stomach when glucose is low.  Aware to treat with hard candy--reviewed treatment today  Have you ever had hyperglycemia (high  or more)?: yes  How often and what are your symptoms?: Frequently since recently diagnosed with diabetes--no symptoms, does report some vision changes and fatigue  How do you treat hyperglycemia? :  Initiated DM medications and continues to titrate up per PCP until meeting target glucose goals  Have you ever had DKA?: no  Do you ever test for ketones?: no  Acute Complications Skills Assessment Completed: : Yes  Assessment indicates:: Instruction Needed  Area of need?: Yes    Chronic Complications  Reviewed health maintenance: yes  Have you completed your annual diabetes maintenance labwork? : yes  Do you examine your feet daily?: no (Discussed inspecting feet regularly and making sure any wounds are healing and not infected)  Has your doctor examined your feet?: no (new diagnosis--likely will be done at upcoming follow up)  Do you see a Dentist?: yes  Dentist date of last visit:: last year--patient states he is due soon for 6 month cleaning  Do you see an eye doctor?: yes  Eye doctor date of last visit:: March 2025--[facility]  Chronic Complications Skills Assessment Completed: : Yes  Assessment indicates:: Adequate understanding  Area of need?: No      During today's follow-up visit,  the following areas required further assessment and content was provided/reviewed.    Based on today's diabetes care assessment, the following areas of need were identified:      Identified Areas of Need      Medication/Current Diabetes Treatment: Yes--see care plan, DM medication adjusted today per PCP   Nutrition/Healthy Eating: No    Home Blood Glucose Monitoring: No    Acute Complications: Yes --see care plan   Chronic Complications: No     Today's interventions were provided through individual discussion, instruction, and written materials were provided.    Patient verbalized understanding of instruction and written materials.  Pt was able to return back demonstration of instructions today. Patient understood key points, needs reinforcement and further instruction.     Diabetes Self-Management Care Plan Review and Evaluation of Progress:    During today's follow-up Josef's Diabetes Self-Management Care Plan  progress was reviewed and progress was evaluated including his/her input. Josef has agreed to continue his/her journey to improve/maintain overall diabetes control by continuing to set health goals. See care plan progress below.      Care Plan: Diabetes Management   Updates made since 6/19/2024 12:00 AM     Problem: Medications         Goal: Due to continued hyperglycemia--PCP (Manfred) will add Mounjaro 2.5 mg weekly. Continue Tresiba 40 units QHS and increase Humalog at meals to 10 units + correction scale (150/50). Patient also continues to titrate up metformin  mg --taking 1 tablet twice daily and will add 2nd tablet (    Start Date: 6/12/2025   Expected End Date: 9/12/2025   This Visit's Progress: On track   Priority: High   Barriers: No Barriers Identified   Note:    6/19/25:  DM medications have been gradually titrated up to meet target glucose goals.  Patient reports failing semaglutide in past due to GI side effects but is willing to try Mounjaro 2.5 mg weekly as glucose levels improve.  Will continue to follow glucose levels closely and work with PCP to adjust DM medications as warranted.        Problem: Blood Glucose Self-Monitoring         Goal: Patient agrees to  continue to check glucose 3 times daily before meals--plans to initiate Dexcom G7 in next few days (pending pharmacy fulfillment). Completed 6/19/2025   Start Date: 6/12/2025   Expected End Date: 6/19/2025   This Visit's Progress: Met   Priority: Low   Barriers: Lack of Supplies   Note:    6/19/25: Patient initiated Dexcom G7 on 6/13/25 and now shares glucose data with diabetes care specialist.  Discussed adjusting high/low glucose alerts today set initially as glucose control improving. Will be changing first sensor in next few days and reviewed how to change sensor at home. No issues with use of Dexcom G7 noted.     Dexcom settings: (uses Dexcom G7 mobile kwasi)  Low alert: 150 mg/dL--will decrease to 100 mg/dL today (will  continue to decrease as control improves to recommended 70-80 mg/dL)  High alert: 400 mg/dL--will decrease to 300 mg/dL     Patient waiting to  Dexcom G7 sensors--pharmacy does not have stock and states will be available in next few days.  Educated on use of Dexcom G7 and once he initiates, he is agreeable to share glucose data with provider.        Problem: Healthy Eating         Goal: Do not eliminate carbs completely from meals. Include at least 30-45 grams of total carbs at each meal incorporated with lean protein and non-starchy vegetables.    Start Date: 6/12/2025   Expected End Date: 9/12/2025   This Visit's Progress: On track   Priority: Medium   Barriers: No Barriers Identified   Note:    6/19/25: Patient making an effort to include at least 20-30 grams of carbs at each meal instead of eliminating completely.  24-hour recall obtained and patient choosing balanced meals.  Snacking on no carb items such as pork skins or cheese.  Denies any regularly sweetened beverages--good water intake.        Problem: Acute Complications         Long-Range Goal: Patient agrees to identify and manage signs and symptoms of high/low blood sugar (hyper/hypoglycemia) by keeping a log of events and using proper treatment.    Start Date: 6/19/2025   Priority: Low   Barriers: No Barriers Identified        Task: Reviewed proper treatment of hypoglycemia with the rule of 15--patient to eat 15g simple carbohydrate (4 glucose tablets, 1 glucose gel, 5 pieces hard candy, ½ cup fruit juice, ½ can regular soda, etc) and wait 15 minutes and recheck home glucose. Completed 6/19/2025        Task: Reviewed common causes and precautions to help prevent hyper/hypoglycemic events. Completed 6/19/2025        Task: Reviewed signs and symptoms of hyper/hypoglycemia, what range is considered to be hyper/hypoglycemia, and when to seek further medical attention. Completed 6/19/2025        Follow Up Plan     Follow up in 1 week (on 6/26/2025)  with [employer] message to see if Mounjaro initiated and if so then see if tolerating (previously failed Ozempic due to side effects). Dexcom G7 reveals glucose control slowly improving but not yet meeting target glycemic control. PCP initiating Mounjaro and patient to continue Tresiba 40 units QHS, Humalog, and metformin. Humalog dose increased from 8 units AC to 10 units AC + correction/sliding scale (150/50). Metformin titration continues--will increase to 1000 mg (2 tablets) with dinner this evening and continue 500 mg in morning.  Recent labs resulted and confirms Type 2 diabetes diagnosis.  24-hour food recall obtained and patient no longer skipping carbs completely but including approx 30 grams at all meals and balancing meal with lean protein and non-starchy vegetables.  Continue exercise--riding stationary bicycle for 15-16 minutes most days and will restart doing yard work (weed eating). Next Hgb A1c scheduled for 8/26/25 with PCP follow up on 9/2/25.    Today's care plan and follow up schedule was discussed with patient.  Josef verbalized understanding of the care plan, goals, and agrees to follow up plan.        The patient was encouraged to communicate with his/her health care provider/physician and care team regarding his/her condition(s) and treatment.  I provided the patient with my contact information today and encouraged to contact me via phone or [facility] Patient Portal as needed.     Length of Visit   Total Time: 50 Minutes

## 2025-06-23 ENCOUNTER — PATIENT MESSAGE (OUTPATIENT)
Dept: DIABETES | Facility: CLINIC | Age: 50
End: 2025-06-23
Payer: COMMERCIAL

## 2025-06-23 NOTE — TELEPHONE ENCOUNTER
Contacted patient to clarify decreasing Tresiba and Humalog doses due to initiating Mounjaro and now that Dexcom CGM data reveal improving glucose control.    Patient verbalizes understanding/clarification of Humalog dosing before meals.     Answered questions regarding lightheadedness and symptoms reported.  Reviewed possible reasons this could be occurring as glucose control improves.  Did educate on how to treat hypoglycemia (glucose < 70 mg/dL) if occurs and to notify provider.     Recommend decreasing Dexcom alert settings on Dexcom G7 mobile kwasi to below as glucose control continues to improve:  High glucose alert: 300 mg.dL --decrease to 250 mg/dL  Low glucose alert: 100 mg/dL--decrease to 80 mg/dL

## 2025-06-25 ENCOUNTER — PATIENT MESSAGE (OUTPATIENT)
Dept: DIABETES | Facility: CLINIC | Age: 50
End: 2025-06-25
Payer: COMMERCIAL

## 2025-06-27 NOTE — TELEPHONE ENCOUNTER
Contacting patient to check on progress since discontinuing basal insulin completely. Glucose levels remain consistently < 150 mg/dL on metformin and low dose Mounjaro.    Patient encouraged to call with any issues with glucose control or additional questions.      Notified patient that insulin prescriptions remain active for now in event glucose control worsens and patient has to restart insulin.     Next Hgb A1c scheduled for 8/26/25 with PCP follow up 9/2/25.

## 2025-07-02 ENCOUNTER — PATIENT MESSAGE (OUTPATIENT)
Dept: DIABETES | Facility: CLINIC | Age: 50
End: 2025-07-02
Payer: COMMERCIAL

## 2025-07-02 ENCOUNTER — PATIENT MESSAGE (OUTPATIENT)
Dept: FAMILY MEDICINE | Facility: CLINIC | Age: 50
End: 2025-07-02
Payer: COMMERCIAL

## 2025-07-03 DIAGNOSIS — E66.813 CLASS 3 SEVERE OBESITY WITH SERIOUS COMORBIDITY AND BODY MASS INDEX (BMI) OF 50.0 TO 59.9 IN ADULT, UNSPECIFIED OBESITY TYPE: ICD-10-CM

## 2025-07-03 DIAGNOSIS — R06.83 SNORING: ICD-10-CM

## 2025-07-03 DIAGNOSIS — R73.01 IMPAIRED FASTING GLUCOSE: ICD-10-CM

## 2025-07-03 DIAGNOSIS — Z79.899 MEDICATION MANAGEMENT: ICD-10-CM

## 2025-07-03 DIAGNOSIS — R73.03 PREDIABETES: ICD-10-CM

## 2025-07-03 DIAGNOSIS — I10 ESSENTIAL HYPERTENSION: ICD-10-CM

## 2025-07-03 DIAGNOSIS — R63.5 ABNORMAL WEIGHT GAIN: ICD-10-CM

## 2025-07-03 DIAGNOSIS — D50.9 CHRONIC IRON DEFICIENCY ANEMIA: ICD-10-CM

## 2025-07-03 DIAGNOSIS — E66.01 CLASS 3 SEVERE OBESITY WITH SERIOUS COMORBIDITY AND BODY MASS INDEX (BMI) OF 50.0 TO 59.9 IN ADULT, UNSPECIFIED OBESITY TYPE: ICD-10-CM

## 2025-07-03 DIAGNOSIS — R60.0 LOCALIZED EDEMA: ICD-10-CM

## 2025-07-03 DIAGNOSIS — E88.819 INSULIN RESISTANCE: ICD-10-CM

## 2025-07-03 DIAGNOSIS — R53.83 FATIGUE, UNSPECIFIED TYPE: ICD-10-CM

## 2025-07-03 DIAGNOSIS — M79.89 LEG SWELLING: Primary | ICD-10-CM

## 2025-07-03 RX ORDER — FUROSEMIDE 40 MG/1
40 TABLET ORAL DAILY PRN
Qty: 90 TABLET | Refills: 0 | Status: SHIPPED | OUTPATIENT
Start: 2025-07-03

## 2025-07-03 RX ORDER — POTASSIUM CHLORIDE 750 MG/1
TABLET, EXTENDED RELEASE ORAL
Qty: 90 TABLET | Refills: 3 | Status: SHIPPED | OUTPATIENT
Start: 2025-07-03

## 2025-08-04 ENCOUNTER — PATIENT MESSAGE (OUTPATIENT)
Dept: FAMILY MEDICINE | Facility: CLINIC | Age: 50
End: 2025-08-04
Payer: COMMERCIAL

## 2025-08-04 DIAGNOSIS — E11.65 UNCONTROLLED TYPE 2 DIABETES MELLITUS WITH HYPERGLYCEMIA: ICD-10-CM

## 2025-08-04 NOTE — TELEPHONE ENCOUNTER
No care due was identified.  Health [facility] Embedded Care Due Messages. Reference number: [id number].   8/04/2025 10:52:51 AM CDT

## 2025-08-12 ENCOUNTER — PATIENT MESSAGE (OUTPATIENT)
Dept: FAMILY MEDICINE | Facility: CLINIC | Age: 50
End: 2025-08-12
Payer: COMMERCIAL

## 2025-08-12 DIAGNOSIS — Z12.5 PROSTATE CANCER SCREENING: Primary | ICD-10-CM

## 2025-08-26 ENCOUNTER — LAB VISIT (OUTPATIENT)
Dept: LAB | Facility: HOSPITAL | Age: 50
End: 2025-08-26
Attending: STUDENT IN AN ORGANIZED HEALTH CARE EDUCATION/TRAINING PROGRAM
Payer: COMMERCIAL

## 2025-08-27 ENCOUNTER — PATIENT MESSAGE (OUTPATIENT)
Dept: DIABETES | Facility: CLINIC | Age: 50
End: 2025-08-27
Payer: COMMERCIAL

## 2025-08-29 ENCOUNTER — RESULTS FOLLOW-UP (OUTPATIENT)
Dept: FAMILY MEDICINE | Facility: CLINIC | Age: 50
End: 2025-08-29
Payer: COMMERCIAL

## 2025-09-02 ENCOUNTER — LAB VISIT (OUTPATIENT)
Dept: LAB | Facility: HOSPITAL | Age: 50
End: 2025-09-02
Attending: STUDENT IN AN ORGANIZED HEALTH CARE EDUCATION/TRAINING PROGRAM
Payer: COMMERCIAL

## 2025-09-02 DIAGNOSIS — E11.9 TYPE 2 DIABETES MELLITUS WITHOUT COMPLICATION, WITHOUT LONG-TERM CURRENT USE OF INSULIN: ICD-10-CM

## 2025-09-02 LAB
ALBUMIN/CREAT UR: 5.6 UG/MG
CREAT UR-MCNC: 108 MG/DL (ref 23–375)
MICROALBUMIN UR-MCNC: 6 UG/ML

## 2025-09-02 PROCEDURE — 82043 UR ALBUMIN QUANTITATIVE: CPT

## 2025-09-03 ENCOUNTER — TELEPHONE (OUTPATIENT)
Dept: FAMILY MEDICINE | Facility: CLINIC | Age: 50
End: 2025-09-03
Payer: COMMERCIAL